# Patient Record
Sex: FEMALE | Race: WHITE | Employment: UNEMPLOYED | URBAN - METROPOLITAN AREA
[De-identification: names, ages, dates, MRNs, and addresses within clinical notes are randomized per-mention and may not be internally consistent; named-entity substitution may affect disease eponyms.]

---

## 2017-05-30 PROBLEM — C50.312 BREAST CANCER OF LOWER-INNER QUADRANT OF LEFT FEMALE BREAST (HCC): Status: ACTIVE | Noted: 2017-05-30

## 2017-06-22 PROBLEM — Z51.0 ENCOUNTER FOR ANTINEOPLASTIC RADIATION THERAPY: Status: ACTIVE | Noted: 2017-06-22

## 2021-05-10 ENCOUNTER — HOSPITAL ENCOUNTER (EMERGENCY)
Age: 76
Discharge: HOME | End: 2021-05-10
Attending: EMERGENCY MEDICINE
Payer: MEDICARE

## 2021-05-10 ENCOUNTER — APPOINTMENT (OUTPATIENT)
Dept: CT IMAGING | Age: 76
End: 2021-05-10
Payer: MEDICARE

## 2021-05-10 VITALS
TEMPERATURE: 97 F | SYSTOLIC BLOOD PRESSURE: 158 MMHG | RESPIRATION RATE: 18 BRPM | HEIGHT: 66 IN | OXYGEN SATURATION: 98 % | HEART RATE: 53 BPM | BODY MASS INDEX: 27.46 KG/M2 | WEIGHT: 170.86 LBS | DIASTOLIC BLOOD PRESSURE: 73 MMHG

## 2021-05-10 DIAGNOSIS — R03.0 ELEVATED BLOOD PRESSURE READING: ICD-10-CM

## 2021-05-10 DIAGNOSIS — R42 DIZZINESS: Primary | ICD-10-CM

## 2021-05-10 LAB
ANION GAP SERPL CALCULATED.3IONS-SCNC: 11 MMOL/L (ref 3–16)
BASOPHILS ABSOLUTE: 0 K/UL (ref 0–0.2)
BASOPHILS RELATIVE PERCENT: 0.5 %
BUN BLDV-MCNC: 36 MG/DL (ref 7–20)
CALCIUM SERPL-MCNC: 9.4 MG/DL (ref 8.3–10.6)
CHLORIDE BLD-SCNC: 105 MMOL/L (ref 99–110)
CO2: 25 MMOL/L (ref 21–32)
CREAT SERPL-MCNC: 1.1 MG/DL (ref 0.6–1.2)
EOSINOPHILS ABSOLUTE: 0.1 K/UL (ref 0–0.6)
EOSINOPHILS RELATIVE PERCENT: 1.4 %
GFR AFRICAN AMERICAN: 59
GFR NON-AFRICAN AMERICAN: 48
GLUCOSE BLD-MCNC: 97 MG/DL (ref 70–99)
GLUCOSE BLD-MCNC: 98 MG/DL
GLUCOSE BLD-MCNC: 98 MG/DL (ref 70–99)
HCT VFR BLD CALC: 32.7 % (ref 36–48)
HEMOGLOBIN: 11.1 G/DL (ref 12–16)
LYMPHOCYTES ABSOLUTE: 1.5 K/UL (ref 1–5.1)
LYMPHOCYTES RELATIVE PERCENT: 22.7 %
MCH RBC QN AUTO: 30.8 PG (ref 26–34)
MCHC RBC AUTO-ENTMCNC: 33.9 G/DL (ref 31–36)
MCV RBC AUTO: 91 FL (ref 80–100)
MONOCYTES ABSOLUTE: 0.8 K/UL (ref 0–1.3)
MONOCYTES RELATIVE PERCENT: 11.7 %
NEUTROPHILS ABSOLUTE: 4.3 K/UL (ref 1.7–7.7)
NEUTROPHILS RELATIVE PERCENT: 63.7 %
PDW BLD-RTO: 14 % (ref 12.4–15.4)
PERFORMED ON: NORMAL
PLATELET # BLD: 252 K/UL (ref 135–450)
PMV BLD AUTO: 8.9 FL (ref 5–10.5)
POTASSIUM REFLEX MAGNESIUM: 4.1 MMOL/L (ref 3.5–5.1)
RBC # BLD: 3.59 M/UL (ref 4–5.2)
SODIUM BLD-SCNC: 141 MMOL/L (ref 136–145)
TROPONIN: <0.01 NG/ML
WBC # BLD: 6.8 K/UL (ref 4–11)

## 2021-05-10 PROCEDURE — 6370000000 HC RX 637 (ALT 250 FOR IP): Performed by: PHYSICIAN ASSISTANT

## 2021-05-10 PROCEDURE — 84484 ASSAY OF TROPONIN QUANT: CPT

## 2021-05-10 PROCEDURE — 6360000002 HC RX W HCPCS: Performed by: PHYSICIAN ASSISTANT

## 2021-05-10 PROCEDURE — 85025 COMPLETE CBC W/AUTO DIFF WBC: CPT

## 2021-05-10 PROCEDURE — 96374 THER/PROPH/DIAG INJ IV PUSH: CPT

## 2021-05-10 PROCEDURE — 80048 BASIC METABOLIC PNL TOTAL CA: CPT

## 2021-05-10 PROCEDURE — 93005 ELECTROCARDIOGRAM TRACING: CPT | Performed by: EMERGENCY MEDICINE

## 2021-05-10 PROCEDURE — 99283 EMERGENCY DEPT VISIT LOW MDM: CPT

## 2021-05-10 PROCEDURE — 70450 CT HEAD/BRAIN W/O DYE: CPT

## 2021-05-10 RX ORDER — MECLIZINE HYDROCHLORIDE 25 MG/1
25 TABLET ORAL 3 TIMES DAILY PRN
Qty: 10 TABLET | Refills: 0 | Status: SHIPPED | OUTPATIENT
Start: 2021-05-10 | End: 2022-03-23

## 2021-05-10 RX ORDER — ONDANSETRON 2 MG/ML
4 INJECTION INTRAMUSCULAR; INTRAVENOUS ONCE
Status: COMPLETED | OUTPATIENT
Start: 2021-05-10 | End: 2021-05-10

## 2021-05-10 RX ORDER — ONDANSETRON 4 MG/1
4 TABLET, ORALLY DISINTEGRATING ORAL EVERY 8 HOURS PRN
Qty: 10 TABLET | Refills: 0 | Status: SHIPPED | OUTPATIENT
Start: 2021-05-10 | End: 2022-03-23

## 2021-05-10 RX ORDER — MECLIZINE HCL 12.5 MG/1
25 TABLET ORAL ONCE
Status: COMPLETED | OUTPATIENT
Start: 2021-05-10 | End: 2021-05-10

## 2021-05-10 RX ADMIN — ONDANSETRON 4 MG: 2 INJECTION INTRAMUSCULAR; INTRAVENOUS at 18:11

## 2021-05-10 RX ADMIN — MECLIZINE 25 MG: 12.5 TABLET ORAL at 18:13

## 2021-05-10 ASSESSMENT — PAIN SCALES - GENERAL: PAINLEVEL_OUTOF10: 7

## 2021-05-10 NOTE — ED PROVIDER NOTES
[address] [facility]      Pt Name: Claudia Sauceda  MRN: [id number]  [place] 1945  Date of evaluation: 5/10/2021  Provider: LADONNA Jarvis    This patient was not seen and evaluated by the attending physician No att. providers found. CHIEF COMPLAINT       Chief Complaint   Patient presents with    Dizziness    Headache         HISTORY OF PRESENT ILLNESS  (Location/Symptom, Timing/Onset, Context/Setting, Quality, Duration, Modifying Factors, Severity.)   Claudia Sauceda is a 77 y.o. female who presents to the emergency department for dizziness and headache. Patient reports that she was outside around 3 PM cleaning the door so that she could paint it. Reports she was bent over with her head looking up when she started with vertigo. She reports she had this a few months ago. She will take Dramamine for typically. She reports nausea but denies vomiting. Denies vision changes, numbness, tingling, difficulty talking, slurred speech, difficulty with word finding. Reports this episode of vertigo is a little different than her prior episodes of vertigo because she also has a headache associated with it. Headache is frontal.  Denies abdominal pain, chest pain, shortness of breath      Nursing Notes were reviewed and I agree. REVIEW OF SYSTEMS    (2-9 systems for level 4, 10 or more for level 5)     Review of Systems   Eyes: Negative for visual disturbance. Respiratory: Negative for shortness of breath. Cardiovascular: Negative for chest pain. Gastrointestinal: Positive for nausea. Negative for abdominal pain and vomiting. Neurological: Positive for dizziness. Negative for speech difficulty and numbness. Except as noted above the remainder of the review of systems was reviewed and negative.        PAST MEDICAL HISTORY         Diagnosis Date    Arthritis     High cholesterol     Hypertension     Sciatica SURGICAL HISTORY           Procedure Laterality Date    BREAST BIOPSY Left 1994 & 2017    BREAST LUMPECTOMY Left 1994 & 2017    BUNIONECTOMY Right 1997    KNEE SURGERY  01/2015       CURRENT MEDICATIONS       Discharge Medication List as of 5/10/2021  9:17 PM      CONTINUE these medications which have NOT CHANGED    Details   lisinopril (PRINIVIL;ZESTRIL) 10 MG tablet Take 10 mg by mouth dailyHistorical Med      simvastatin (ZOCOR) 40 MG tablet Take 40 mg by mouth nightlyHistorical Med      aspirin 81 MG tablet Take 81 mg by mouth dailyHistorical Med      Misc Natural Products (GLUCOSAMINE CHONDROITIN TRIPLE PO) Take by mouthHistorical Med      Omega-3 Fatty Acids (FISH OIL) 1200 MG CAPS Take by mouthHistorical Med      calcium carbonate 600 MG TABS tablet Take 1 tablet by mouth dailyHistorical Med      b complex vitamins capsule Take 1 capsule by mouth dailyHistorical Med      folic acid (FOLVITE) 1 MG tablet Take 1 mg by mouth dailyHistorical Med      FIBER SELECT GUMMIES PO Take by mouthHistorical Med      Naproxen Sodium (ALEVE PO) Take by mouth as neededHistorical Med      acetaminophen (TYLENOL) 325 MG tablet Take 650 mg by mouth every 6 hours as needed for PainHistorical Med             ALLERGIES     Celebrex [celecoxib], Gabapentin, and Valium [diazepam]    FAMILY HISTORY           Problem Relation Age of Onset    Breast Cancer Mother     Cancer Mother         melanoma     Cancer Father         luekemia     Coronary Art Dis Father     Osteoarthritis Father     Parkinsonism Father     Ulcerative Colitis Father      Family Status   Relation Name Status    Mother  (Not Specified)    Father  (Not Specified)        SOCIAL HISTORY      reports that she has never smoked. She does not have any smokeless tobacco history on file. She reports that she does not drink alcohol.     PHYSICAL EXAM    (up to 7 for level 4, 8 or more for level 5)     ED Triage Vitals [05/10/21 1702]   BP Temp Temp Source Pulse Resp SpO2 Height Weight   (!) 178/93 97 °F (36.1 °C) Temporal 64 18 97 % 5' 6\" (1.676 m) 170 lb 13.7 oz (77.5 kg)       Physical Exam  Constitutional:       General: She is not in acute distress. Appearance: Normal appearance. She is well-developed. She is not ill-appearing, toxic-appearing or diaphoretic. HENT:      Head: Normocephalic and atraumatic. Eyes:      Extraocular Movements: Extraocular movements intact. Conjunctiva/sclera: Conjunctivae normal.      Pupils: Pupils are equal, round, and reactive to light. Neck:      Musculoskeletal: Normal range of motion and neck supple. Cardiovascular:      Rate and Rhythm: Normal rate and regular rhythm. Heart sounds: Normal heart sounds. Pulmonary:      Effort: Pulmonary effort is normal. No respiratory distress. Breath sounds: Normal breath sounds. Musculoskeletal: Normal range of motion. Skin:     General: Skin is warm. Neurological:      Mental Status: She is alert. Sensory: No sensory deficit. Motor: No weakness. Coordination: Coordination normal.      Comments: Normal finger to nose, rapid alternating hands, heel to shin bilaterally  No pronator drift  No facial drooping  No drift of the extremities against gravity  5/5 strength of all 4 extremities  Normal and symmetric  strength bilaterlaly  Normal and symmetric sensation throughout  Normal EOM. No nystagmus bilaterally  Negative test of skew  No trunchal instability. Psychiatric:         Mood and Affect: Mood normal.         Behavior: Behavior normal.         Thought Content: Thought content normal.         Judgment: Judgment normal.         DIFFERENTIAL DIAGNOSIS   Vertigo, stroke, other    DIAGNOSTICRESULTS     EKG: All EKG's are interpreted by LADONNA Perez in the absence of a cardiologist.    EKG obtained.  See Dr. Imelda Helms note for interpretation  RADIOLOGY:   Non-plain film images such as CT, Ultrasound and MRI are read by the radiologist. Flora Alvarado radiographic images are visualized and preliminarily interpreted by LADONNA Maki with the below findings:      Interpretation per the Radiologist below, if available at the time of this note:    CT HEAD WO CONTRAST   Final Result   No acute hemorrhage or midline shift. Partial opacification of pneumatized left petrous apex. Other findings as described. LABS:  Results for orders placed or performed during the hospital encounter of 05/10/21   CBC Auto Differential   Result Value Ref Range    WBC 6.8 4.0 - 11.0 K/uL    RBC 3.59 (L) 4.00 - 5.20 M/uL    Hemoglobin 11.1 (L) 12.0 - 16.0 g/dL    Hematocrit 32.7 (L) 36.0 - 48.0 %    MCV 91.0 80.0 - 100.0 fL    MCH 30.8 26.0 - 34.0 pg    MCHC 33.9 31.0 - 36.0 g/dL    RDW 14.0 12.4 - 15.4 %    Platelets 866 015 - 538 K/uL    MPV 8.9 5.0 - 10.5 fL    Neutrophils % 63.7 %    Lymphocytes % 22.7 %    Monocytes % 11.7 %    Eosinophils % 1.4 %    Basophils % 0.5 %    Neutrophils Absolute 4.3 1.7 - 7.7 K/uL    Lymphocytes Absolute 1.5 1.0 - 5.1 K/uL    Monocytes Absolute 0.8 0.0 - 1.3 K/uL    Eosinophils Absolute 0.1 0.0 - 0.6 K/uL    Basophils Absolute 0.0 0.0 - 0.2 K/uL   Basic Metabolic Panel w/ Reflex to MG   Result Value Ref Range    Sodium 141 136 - 145 mmol/L    Potassium reflex Magnesium 4.1 3.5 - 5.1 mmol/L    Chloride 105 99 - 110 mmol/L    CO2 25 21 - 32 mmol/L    Anion Gap 11 3 - 16    Glucose 97 70 - 99 mg/dL    BUN 36 (H) 7 - 20 mg/dL    CREATININE 1.1 0.6 - 1.2 mg/dL    GFR Non- 48 (A) >60    GFR  59 (A) >60    Calcium 9.4 8.3 - 10.6 mg/dL   Troponin   Result Value Ref Range    Troponin <0.01 <0.01 ng/mL   POCT Glucose   Result Value Ref Range    Glucose 98 mg/dL   POCT Glucose   Result Value Ref Range    POC Glucose 98 70 - 99 mg/dl    Performed on ACCU-CHEK        All other labs were withinnormal range or not returned as of this dictation.     EMERGENCY DEPARTMENT COURSE and DIFFERENTIAL DIAGNOSIS/MDM:   Vitals:    Vitals:    05/10/21 1702 05/10/21 2100   BP: (!) 178/93 (!) 158/73   Pulse: 64 53   Resp: 18 18   Temp: 97 °F (36.1 °C)    TempSrc: Temporal    SpO2: 97% 98%   Weight: 170 lb 13.7 oz (77.5 kg)    Height: 5' 6\" (1.676 m)        Patient was nontoxic, well appearing, afebrile with normal vital signs with the exception of hypertension. Saturating well on room air. Has had episodes of vertigo in the past. NIHSS is 0 with negative test of skew and no trunchal instability. Based on this, low suspicion for CVA. CT head obtained due ot her headache and was negative. Labs unremarkable aside form BUN 36. After meclizine and zofran, she reports symptoms are better. She ambulated in department without difficulty. Believe appropriate for outpatient management. Will discharge with Zofran and meclizine. Instructed to follow-up with PCP next few days for reevaluation return for worsening. Agreed understood. I completed a structured, evidence-based clinical evaluation to screen for acute stroke and neurologic deficits in this patient. The patient has a normal detailed neurologic exam, which is highly sensitive for dangerous causes of dizziness, vertigo, or loss of balance. The evidence indicates that the patient is very low risk for an acute neurologic emergency, and this is consistent with my clinical intuition. The risk of further workup or hospitalization is likely higher than the risk of the patient having a stroke or other dangerous neurologic condition. It is, therefore, in the patients best interest not to do additional emergent testing or to be hospitalized at this time. PROCEDURES:  None    FINAL IMPRESSION      1. Dizziness    2.  Elevated blood pressure reading          DISPOSITION/PLAN   DISPOSITION Decision To Discharge 05/10/2021 09:41:52 PM      PATIENT REFERRED TO:  David White MD  [address]  [address] [address]  [phone]    Schedule an appointment as soon as possible for a visit in 2 days  for reevaluation    [facility] Emergency Department  [address] [facility]  [phone]    As needed, If symptoms worsen      DISCHARGE MEDICATIONS:  Discharge Medication List as of 5/10/2021  9:17 PM      START taking these medications    Details   meclizine (ANTIVERT) 25 MG tablet Take 1 tablet by mouth 3 times daily as needed for Dizziness, Disp-10 tablet, R-0Print      ondansetron (ZOFRAN ODT) 4 MG disintegrating tablet Take 1 tablet by mouth every 8 hours as needed for Nausea or Vomiting Let dissolve in mouth., Disp-10 tablet, R-0Print             (Please note that portions of this note werecompleted with a voice recognition program.  Efforts were made to edit the dictations but occasionally words are mis-transcribed.)    Stewart Coates, [address] [place] [address]  05/11/21 2971

## 2021-05-10 NOTE — ED NOTES
Patient voices her headache is now a 2/10, dizziness has resolved, nausea has resolved.       Gume Osuna RN  05/10/21 1929

## 2021-05-11 LAB
EKG ATRIAL RATE: 59 BPM
EKG DIAGNOSIS: NORMAL
EKG P AXIS: 63 DEGREES
EKG P-R INTERVAL: 144 MS
EKG Q-T INTERVAL: 426 MS
EKG QRS DURATION: 80 MS
EKG QTC CALCULATION (BAZETT): 421 MS
EKG R AXIS: 24 DEGREES
EKG T AXIS: 41 DEGREES
EKG VENTRICULAR RATE: 59 BPM

## 2021-05-11 PROCEDURE — 93010 ELECTROCARDIOGRAM REPORT: CPT | Performed by: INTERNAL MEDICINE

## 2021-05-11 ASSESSMENT — ENCOUNTER SYMPTOMS
ABDOMINAL PAIN: 0
NAUSEA: 1
SHORTNESS OF BREATH: 0
VOMITING: 0

## 2021-05-11 NOTE — ED NOTES
Patient discharged with discharge instructions and medications reviewed. Patient verbalized understanding of instructions and follow up.        Karina Rodney, JOSE JUAN  05/10/21 9209

## 2021-05-11 NOTE — ED PROVIDER NOTES
[address] [facility]      Pt Name: Elvia Botello  MRN: [id number]  [place] 1945  Date of evaluation: 5/10/2021  Provider: Nora Richmond, [address] [place] [address]  Chief Complaint   Patient presents with    Dizziness    Headache       I have fully participated in the care of Elvia Botello and have had a face-to-face evaluation. I have reviewed and agree with all pertinent clinical information, and midlevel provider's history, and physical exam. I have also reviewed the labs, EKG, and imaging studies and treatment plan. I have also reviewed and agree with the medications, allergies and past medical history section for this Elvia Botello. I agree with the diagnosis, and I concur. I wore personal protective equipment when I was in the room the entire time. This includes gloves, N95 mask, face shield, and a glove over my stethoscope for protection. Past Medical History:   Diagnosis Date    Arthritis     High cholesterol     Hypertension     Sciatica        MDM:  Elvia Botello is a 77 y.o. female who presents with dizziness that she describes as vertigo. And this is her typical vertigo. However, she had a headache with this episode. She has had headaches in the past but never with her vertigo. Symptoms were different this time and she just came in for an evaluation. Physical exam was normal.  However, she did have slow closure of her right eye initially but that resolved without any treatment. Physical exam she had an NIH stroke scale of 0. Neuro exam was negative. Remainder of her work-up was normal.  There is no nystagmus. CT scan of her head was negative. The remainder of her work-up was normal.  She improved with Antivert and Zofran. Patient preferred to be discharged. She was discharged to follow with her doctor in 3 to 5 days and return if any problems.   Her NIH stroke scale was 0 on arrival and 0 at discharge. Vitals:    05/10/21 2100   BP: (!) 158/73   Pulse: 53   Resp: 18   Temp:    SpO2: 98%       Lab results  Labs Reviewed   CBC WITH AUTO DIFFERENTIAL - Abnormal; Notable for the following components:       Result Value    RBC 3.59 (*)     Hemoglobin 11.1 (*)     Hematocrit 32.7 (*)     All other components within normal limits    Narrative:     Performed at:  [facility]  [address] [facility] [address]   Phone [phone]   BASIC METABOLIC PANEL W/ REFLEX TO MG FOR LOW K - Abnormal; Notable for the following components:    BUN 36 (*)     GFR Non- 48 (*)     GFR  59 (*)     All other components within normal limits    Narrative:     Performed at:  [facility]  [address] [facility] [address]   Phone [phone]   POCT GLUCOSE - Normal   TROPONIN    Narrative:     Performed at:  [facility]  [address] [facility] [address]   Phone [phone]   POCT GLUCOSE    Narrative:     Performed at:  [address] [place] [address]  [address] [facility]   Phone [phone]       EKG Results  EKG Interpretation    Interpreted by emergency department physician  Time performed: 7770  Time read: 1713    Rhythm: Sinus bradycardia  Ventricular Rate: 59  QRS Axis: 24  Ectopy: None  Conduction: Sinus bradycardia with biatrial abnormality  ST Segments: normal  T Waves: normal  Q Waves: None noted    Other findings: Motion artifact but EKG is readable    Compared to EKG on: None to compare    Clinical Impression: Sinus bradycardia with biatrial abnormality but otherwise normal EKG. There is motion artifact but EKG is readable. There is no old EKG to compare. [address]      Radiology results  CT HEAD WO CONTRAST   Final Result   No acute hemorrhage or midline shift.       Partial opacification of pneumatized left petrous apex. Other findings as described. See discharge instructions for specific medications, discharge information, and treatments. They were verbally instructed to return to emergency if any problems. Medications   ondansetron (ZOFRAN) injection 4 mg (4 mg Intravenous Given 5/10/21 1811)   meclizine (ANTIVERT) tablet 25 mg (25 mg Oral Given 5/10/21 1813)       Discharge Medication List as of 5/10/2021  9:17 PM      START taking these medications    Details   meclizine (ANTIVERT) 25 MG tablet Take 1 tablet by mouth 3 times daily as needed for Dizziness, Disp-10 tablet, R-0Print      ondansetron (ZOFRAN ODT) 4 MG disintegrating tablet Take 1 tablet by mouth every 8 hours as needed for Nausea or Vomiting Let dissolve in mouth., Disp-10 tablet, R-0Print             The patient's blood pressure was found to be elevated according to CMS/Medicare and the Affordable Care Act/ObamaCare criteria. Elevated blood pressure could occur because of pain or anxiety or other reasons and does not mean that they need to have their blood pressure treated or medications otherwise adjusted. However, this could also be a sign that they will need to have their blood pressure treated or medications changed. The patient was instructed to follow up closely with their personal physician to have their blood pressure rechecked. The patient was instructed to take a list of recent blood pressure readings to their next visit with their personal physician. IMPRESSIONS:  1. Dizziness    2.  Elevated blood pressure reading               Lisha Hicks DO  05/10/21 4914

## 2021-05-11 NOTE — ED NOTES
Patient ambulated to bathroom and back, no c/o dizziness, weakness, or nausea.       Maikel Matthews RN  05/10/21 1259

## 2022-01-20 ENCOUNTER — OFFICE VISIT (OUTPATIENT)
Dept: UROGYNECOLOGY | Age: 77
End: 2022-01-20
Payer: MEDICARE

## 2022-01-20 VITALS
HEART RATE: 78 BPM | OXYGEN SATURATION: 96 % | DIASTOLIC BLOOD PRESSURE: 93 MMHG | RESPIRATION RATE: 14 BRPM | SYSTOLIC BLOOD PRESSURE: 175 MMHG | TEMPERATURE: 98 F

## 2022-01-20 DIAGNOSIS — N32.81 OAB (OVERACTIVE BLADDER): ICD-10-CM

## 2022-01-20 DIAGNOSIS — R39.15 URINARY URGENCY: ICD-10-CM

## 2022-01-20 DIAGNOSIS — N39.41 URGE INCONTINENCE: ICD-10-CM

## 2022-01-20 DIAGNOSIS — R35.0 URINARY FREQUENCY: ICD-10-CM

## 2022-01-20 DIAGNOSIS — M51.26 DISPLACEMENT OF LUMBAR INTERVERTEBRAL DISC WITHOUT MYELOPATHY: Primary | ICD-10-CM

## 2022-01-20 PROBLEM — D05.12 INTRADUCTAL CARCINOMA IN SITU OF LEFT BREAST: Status: ACTIVE | Noted: 2017-05-01

## 2022-01-20 PROBLEM — C50.319 MALIGNANT NEOPLASM OF LOWER-INNER QUADRANT OF FEMALE BREAST (HCC): Status: ACTIVE | Noted: 2022-01-20

## 2022-01-20 PROBLEM — R49.0 DYSPHONIA: Status: ACTIVE | Noted: 2018-02-15

## 2022-01-20 PROBLEM — R49.0 DYSPHONIA OF ESSENTIAL TREMOR: Status: ACTIVE | Noted: 2018-02-15

## 2022-01-20 PROBLEM — R25.1 DYSPHONIA OF ESSENTIAL TREMOR: Status: ACTIVE | Noted: 2018-02-15

## 2022-01-20 PROBLEM — I10 HYPERTENSION: Status: ACTIVE | Noted: 2022-01-20

## 2022-01-20 PROBLEM — M43.10 SPONDYLOLISTHESIS: Status: ACTIVE | Noted: 2017-02-08

## 2022-01-20 PROBLEM — M47.816 SPONDYLOSIS OF LUMBAR REGION WITHOUT MYELOPATHY OR RADICULOPATHY: Status: ACTIVE | Noted: 2022-01-20

## 2022-01-20 PROBLEM — E78.2 ELEVATED TRIGLYCERIDES WITH HIGH CHOLESTEROL: Status: ACTIVE | Noted: 2019-11-06

## 2022-01-20 PROBLEM — M47.817 LUMBOSACRAL SPONDYLOSIS WITHOUT MYELOPATHY: Status: ACTIVE | Noted: 2022-01-20

## 2022-01-20 PROBLEM — M53.3 SACROILIAC JOINT DYSFUNCTION OF RIGHT SIDE: Status: ACTIVE | Noted: 2017-09-26

## 2022-01-20 PROCEDURE — G8427 DOCREV CUR MEDS BY ELIG CLIN: HCPCS | Performed by: OBSTETRICS & GYNECOLOGY

## 2022-01-20 PROCEDURE — 1090F PRES/ABSN URINE INCON ASSESS: CPT | Performed by: OBSTETRICS & GYNECOLOGY

## 2022-01-20 PROCEDURE — G8417 CALC BMI ABV UP PARAM F/U: HCPCS | Performed by: OBSTETRICS & GYNECOLOGY

## 2022-01-20 PROCEDURE — G8484 FLU IMMUNIZE NO ADMIN: HCPCS | Performed by: OBSTETRICS & GYNECOLOGY

## 2022-01-20 PROCEDURE — 0509F URINE INCON PLAN DOCD: CPT | Performed by: OBSTETRICS & GYNECOLOGY

## 2022-01-20 PROCEDURE — 99203 OFFICE O/P NEW LOW 30 MIN: CPT | Performed by: OBSTETRICS & GYNECOLOGY

## 2022-01-20 RX ORDER — SOLIFENACIN SUCCINATE 10 MG/1
10 TABLET, FILM COATED ORAL DAILY
Qty: 30 TABLET | Refills: 1 | Status: SHIPPED | OUTPATIENT
Start: 2022-01-20 | End: 2022-01-20

## 2022-01-20 RX ORDER — SOLIFENACIN SUCCINATE 10 MG/1
10 TABLET, FILM COATED ORAL DAILY
Qty: 30 TABLET | Refills: 1 | Status: SHIPPED | OUTPATIENT
Start: 2022-01-20 | End: 2022-03-23

## 2022-01-20 ASSESSMENT — ENCOUNTER SYMPTOMS
BACK PAIN: 1
CONSTIPATION: 1

## 2022-01-20 NOTE — PROGRESS NOTES
1/20/2022      HPI:     Name: Joshua Kamara  YOB: 1945    CC: Patient is a 77 y.o. female who is seen in consultation from No ref. provider found   for evaluation of urge incontinence  and UTI.     HPI: Was seeing Dr. Alejandro Ramachandran in 9/2020 had urodynamics, and cystoscopy done approx 9/25/2020  [facility]   presents for Urodynamic studies. Alternative treatments, risks and benefits were discussed with the patient and they chose to proceed giving informed consent. Procedure: Urodynamic studies were performed in the standard fashion as demonstrated by the following procedure codes: [address], [address], [address], [id number]. Chinese Or, RN, RN    Uroflow:  Voided volume 77.8 ml   Residual volume 340 ml   Maximum flow (QMax) 3.4 ml/sec   Average flow rate (QAvg) 4 ml/sec   [] Patient was unable to void for uroflow. Catheterized for 0 ml.  [] Voided amount <30 ml and no data recorded. Catheterized for 0 ml. Comments:     Cystometrogram:  First Sensation 105 ml   First Desire 198 ml   Maximum Cystometric Capacity 312 ml   Total Infused Volume 321 ml   Uninhibited Detrusor Contraction [] no  [x] yes  at 312 ml,   without incontinence       Comments:     Leak Point Pressures:  1st supine 121 ml at 108 cm/H2O negative leak with no amount of leakage with cough/valsalva   2nd supine 200 ml at 81 cm/H2O positive leak with small amount of leakage with cough/valsalva         Micturition:  Max detrusor pressure at peak flow (Pdet at Qmax) 10.1 cm/H2O       Maximum/Peak flow rate (QMax) 5.3 ml/sec   Flow time 19.2 sec   Voided volume 67.5 ml   Residual 228 ml     EMG: [x] Coordinated [] non-coordinated [] appropriate [] artifactual   [] Patient was unable to void for micturition. Catheterized for 0 ml. Comments:     TEST SUMMARY:  Free flow uroflowmetry shows a normal flow pattern with inadequate emptying. 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  Sensation is normal. Pressure flow studies demonstrate a normal flow pattern, normal voiding pressures, appropriate electromyogram, and inadequate bladder emptying. Provocative studies show evidence of no and stress incontinence. An after-contraction is noted on the voiding study, which may be suggestive of detrusor instability        Bladder control problem: Yes   How many months have you had a bladder problem? 2 years  Do you use pads to absorb lost urine? Yes. If yes how many pads do you wear a day? 1   How many trips do you make to the bathroom during the day? 9-10  How many times do you wake at night to go to the bathroom? 2-3  Do you ever wet the bed while asleep? No  Are there times when you cannot make it to the bathroom on time? No  Does sound, sight, or feel of running water cause you to lose urine? No  How many glasses of liquid do you consume daily? 55-65  How many drinks containing caffeine do you consume daily? 1  Which best describes urine loss: (Check all that apply)   [x] I lose urine during coughing, sneezing, running, lifting   [] I lose urine with changes in posture, standing, walking   [] I lose urine continuously such that I am constantly wet   [] I have sudden, urgent needs without the ability to make it to the bathroom  Have you seen a physician for complaints of urine loss? Yes If yes, who? Ana  Have you taken medication to prevent urine loss? Yes If yes, what meds? Myrbetriq 50mg. Which seems to help a little, but cost is a major concern. >$100per month. Has not tried any other bladder medications. Bladder emptying problems:  Yes, reports occasional difficulty starting her urine stream. However she does feel like bladder is empty after voiding. Prolapse/Vaginal Support problems: No   Bowel problem(s): Yes just occasional constipation, which she uses miralax for. Sexual History:  has no history on file for sexual activity.   Pelvic Pain:  No  Ob/Gyn History:    OB History    Para Term  AB Living   2 2 2     2   SAB IAB Ectopic Molar Multiple Live Births             2      # Outcome Date GA Lbr Chase/2nd Weight Sex Delivery Anes PTL Lv   2 Term      Vag-Spont   CLEOPATRA   1 Term      Vag-Spont   CLEOPATRA     Past Medical History:   Past Medical History:   Diagnosis Date    Arthritis     Breast cancer ([facility] [address]     High cholesterol     Hypertension     Sciatica      Past Surgical History:   Past Surgical History:   Procedure Laterality Date    BREAST BIOPSY Left 1994 & 2017    BREAST LUMPECTOMY Left 1994 & 2017    BUNIONECTOMY Right 1997    KNEE SURGERY  01/2015     Allergies: Allergies   Allergen Reactions    Celebrex [Celecoxib]     Gabapentin      dizzy    Valium [Diazepam]      Current Medications:  Current Outpatient Medications   Medication Sig Dispense Refill    lisinopril (PRINIVIL;ZESTRIL) 10 MG tablet Take 10 mg by mouth daily      simvastatin (ZOCOR) 40 MG tablet Take 40 mg by mouth nightly      aspirin 81 MG tablet Take 81 mg by mouth daily      Misc Natural Products (GLUCOSAMINE CHONDROITIN TRIPLE PO) Take by mouth      Omega-3 Fatty Acids (FISH OIL) 1200 MG CAPS Take by mouth      calcium carbonate 600 MG TABS tablet Take 1 tablet by mouth daily      b complex vitamins capsule Take 1 capsule by mouth daily      FIBER SELECT GUMMIES PO Take by mouth      solifenacin (VESICARE) 10 MG tablet Take 1 tablet by mouth daily 30 tablet 1    meclizine (ANTIVERT) 25 MG tablet Take 1 tablet by mouth 3 times daily as needed for Dizziness (Patient not taking: Reported on 1/20/2022) 10 tablet 0    ondansetron (ZOFRAN ODT) 4 MG disintegrating tablet Take 1 tablet by mouth every 8 hours as needed for Nausea or Vomiting Let dissolve in mouth.  (Patient not taking: Reported on 1/20/2022) 10 tablet 0    folic acid (FOLVITE) 1 MG tablet Take 1 mg by mouth daily (Patient not taking: Reported on 1/20/2022)      Naproxen Sodium (ALEVE PO) Take by mouth as needed (Patient not taking: Reported on 1/20/2022)      acetaminophen (TYLENOL) 325 MG tablet Take 650 mg by mouth every 6 hours as needed for Pain (Patient not taking: Reported on 1/20/2022)       No current facility-administered medications for this visit. Social History:   Social History     Socioeconomic History    Marital status:      Spouse name: Not on file    Number of children: Not on file    Years of education: Not on file    Highest education level: Not on file   Occupational History    Not on file   Tobacco Use    Smoking status: Never Smoker    Smokeless tobacco: Never Used   Substance and Sexual Activity    Alcohol use: No    Drug use: Never    Sexual activity: Not on file   Other Topics Concern    Not on file   Social History Narrative    Not on file     Social Determinants of Health     Financial Resource Strain:     Difficulty of Paying Living Expenses: Not on file   Food Insecurity:     Worried About Running Out of Food in the Last Year: Not on file    Brittni of Food in the Last Year: Not on file   Transportation Needs:     Lack of Transportation (Medical): Not on file    Lack of Transportation (Non-Medical):  Not on file   Physical Activity:     Days of Exercise per Week: Not on file    Minutes of Exercise per Session: Not on file   Stress:     Feeling of Stress : Not on file   Social Connections:     Frequency of Communication with Friends and Family: Not on file    Frequency of Social Gatherings with Friends and Family: Not on file    Attends Faith Services: Not on file    Active Member of Clubs or Organizations: Not on file    Attends Club or Organization Meetings: Not on file    Marital Status: Not on file   Intimate Partner Violence:     Fear of Current or Ex-Partner: Not on file    Emotionally Abused: Not on file    Physically Abused: Not on file    Sexually Abused: Not on file   Housing Stability:     Unable to Pay for Housing in the Last Year: Not on file    Number of [facility] in the Last Year: Not on file    Unstable Housing in the Last Year: Not on file     Family History:   Family History   Problem Relation Age of Onset    Breast Cancer Mother     Cancer Mother         melanoma     Cancer Father         luekemia     Coronary Art Dis Father     Osteoarthritis Father     Parkinsonism Father     Ulcerative Colitis Father      Review of System  Review of Systems   Gastrointestinal: Positive for constipation. Musculoskeletal: Positive for arthralgias, back pain and neck pain. All other systems reviewed and are negative. A review of systems was done by the patient and reviewed by the provider. Objective:     Vital Signs  Vitals:    01/20/22 1254   BP: (!) 175/93   Pulse: 78   Resp: 14   Temp: 98 °F (36.7 °C)   SpO2: 96%     Physical Exam  Physical Exam  HENT:      Head: Normocephalic and atraumatic. Eyes:      Conjunctiva/sclera: Conjunctivae normal.   Pulmonary:      Effort: Pulmonary effort is normal.   Abdominal:      Palpations: Abdomen is soft. Musculoskeletal:      Cervical back: Normal range of motion and neck supple. Skin:     General: Skin is warm and dry. Neurological:      Mental Status: She is alert and oriented to person, place, and time. Office Fill Study/Urine Dip:     Using sterile technique a manometry catheter was placed. Patient's bladder was filled with sterile water by gravity. Capacity and storage volumes were measured. Spasms assessed. Catheter was removed. Stress urinary incontinence was assessed. No results found for this visit on 01/20/22. Assessment/Plan:     Kalyani Okeefe is a 77 y.o. female with   1. Displacement of lumbar intervertebral disc without myelopathy    2. OAB (overactive bladder)    3. Urinary urgency    4. Urinary frequency    5.  Urge incontinence    Old records reviewed, outside records were reviewed, outside urodynamics were reviewed    She presents today as a follow-up for her overactive

## 2022-02-02 RX ORDER — TOLTERODINE 4 MG/1
4 CAPSULE, EXTENDED RELEASE ORAL DAILY
Qty: 30 CAPSULE | Refills: 2 | Status: SHIPPED | OUTPATIENT
Start: 2022-02-02 | End: 2022-03-23

## 2022-02-17 NOTE — PROGRESS NOTES
[facility] Outpatient Physical Therapy  [facility] [address]  Phone: [phone]  Fax: [phone]                                                      Physical Therapy Certification    Dear Referring Practitioner: Terrance Kinsey MD,    We had the pleasure of evaluating the following patient for physical therapy services at [place] [address]. A summary of our findings can be found in the initial assessment below. This includes our plan of care. If you have any questions or concerns regarding these findings, please do not hesitate to contact me at the office phone number checked above. Thank you for the referral.       Physician Signature:_______________________________Date:__________________  By signing above (or electronic signature), therapist's plan is approved by physician      Patient: Kiran Pillai   : 1945   MRN: [id number]  Referring Physician: Referring Practitioner: Terrance Kinsey MD      Evaluation Date: 2022      Medical Diagnosis Information:  Diagnosis: N32.81 - OAB (overactive bladder)                                             Insurance information: PT Insurance Information: Medicare    Second person requested for examination:  [x] No    [] Yes   If yes, who was present:    Precautions/ Contra-indications: NA    Latex Allergy:  [x]NO      []YES    Preferred Language for Healthcare:   [x]English       []Other:    C-SSRS Triggered by Intake questionnaire (Past 2 wk assessment ):   [x] No, Questionnaire did not trigger screening.   [] Yes, Patient intake triggered C-SSRS Screening     [] Completed, no further action required.    [] Completed, PCP notified via Epic    Functional Outcome:   PFDI-20: 71.88  Sub-sections: POPDI-6 Score 20.83; CRADI-8 Score: 9.38; MAGDI-6 Score: 41.67    Female Sexual Function Index (FSFI) NA/36 - not currently involved with penetrative intercourse due to spouse's health problems    SUBJECTIVE: Patient stated complaint:\"overactive bladder\"  - complains of urinary frequency and constant urge - small amount of stress urinary incontinence with coughing or sneezing, but certainly not her main concern. Patient has stopped taking her bladder control medication on 2/17 due to side effects - worst symptom was constipation. Patient reports symptoms began about 2 years ago - thought she was having recurrent UTIs, but did not have positive cultures. Started on and took bladder control medication for 90 days, then stopped due to cost.  Urgency worsened again after having COVID in 9/2021. Reports symptoms increase with change of positions, cough/sneeze, cold weather, and anxiety. Reports increased stress in life due to daughter who recently went through a divorce due to abuse with 3 children who are young adults and need psychological counseling. Also, spouse is not in good health and expecting need for another surgery soon. Rates severity of problem 8/10. Goal: \"less trips to the bathroom especially during the night (3-4 time)\" - gets up early every day and needs to take a nap in the afternoon since she is not well rested    Pregnancies: [] No    [x] Yes, if yes # 2  Deliveries: # and type: 2, vaginal   Menopause - 2000     4 week or greater of failed trial of PFPT program?   [x] No    [] Yes    PFPT program as defined by \"Completing 4 weeks of an ordered plan of pelvic muscle exercises designed to increase periurethral muscle strength\". Relevant Medical History:  Per Dr. Hellen Covington on 1/20/2022  CC: Patient is a 77 y.o. female who is seen in consultation from No ref. provider found   for evaluation of urge incontinence  and UTI.      HPI: Was seeing Dr. Laurent Going in 9/2020 had urodynamics, and cystoscopy done approx 9/25/2020  UDE   presents for Urodynamic studies.    Alternative treatments, risks and benefits were discussed with the patient and they chose to proceed giving informed consent. TEST SUMMARY:  Free flow uroflowmetry shows a normal flow pattern with inadequate emptying. 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 Sensation is normal. Pressure flow studies demonstrate a normal flow pattern, normal voiding pressures, appropriate electromyogram, and inadequate bladder emptying. Provocative studies show evidence of no and stress incontinence. An after-contraction is noted on the voiding study, which may be suggestive of detrusor instability  Bladder control problem: Yes   How many months have you had a bladder problem? 2 years  Do you use pads to absorb lost urine? Yes. If yes how many pads do you wear a day? 1   How many trips do you make to the bathroom during the day? 9-10  How many times do you wake at night to go to the bathroom? 2-3  Do you ever wet the bed while asleep? No  Are there times when you cannot make it to the bathroom on time? No  Does sound, sight, or feel of running water cause you to lose urine? No  How many glasses of liquid do you consume daily? 55-65  How many drinks containing caffeine do you consume daily? 1  Which best describes urine loss: (Check all that apply)  · [x]? I lose urine during coughing, sneezing, running, lifting  · []? I lose urine with changes in posture, standing, walking  · []? I lose urine continuously such that I am constantly wet  · []? I have sudden, urgent needs without the ability to make it to the bathroom  Have you seen a physician for complaints of urine loss? Yes If yes, who? [place]  Have you taken medication to prevent urine loss? Yes If yes, what meds? Myrbetriq 50mg. Which seems to help a little, but cost is a major concern. >$100per month. Has not tried any other bladder medications. Bladder emptying problems:  Yes, reports occasional difficulty starting her urine stream. However she does feel like bladder is empty after voiding.    Prolapse/Vaginal Support problems: No Bowel problem(s): Yes just occasional constipation, which she uses miralax for. Sexual History:  has no history on file for sexual activity. Pelvic Pain:  No   Past Medical History:   Past Medical History        Past Medical History:   Diagnosis Date    Arthritis      Breast cancer ([address]      High cholesterol      Hypertension      Sciatica           Past Surgical History:   Past Surgical History         Past Surgical History:   Procedure Laterality Date    BREAST BIOPSY Left 1994 & 2017    BREAST LUMPECTOMY Left 1994 & 2017    BUNIONECTOMY Right 1997    KNEE SURGERY   01/2015         Review of System  Review of Systems   Gastrointestinal: Positive for constipation. Musculoskeletal: Positive for arthralgias, back pain and neck pain. All other systems reviewed and are negative. Assessment/Plan:      Roverto Navarrete is a 77 y.o. female with   1. Displacement of lumbar intervertebral disc without myelopathy    2. OAB (overactive bladder)    3. Urinary urgency    4. Urinary frequency    5. Urge incontinence    Old records reviewed, outside records were reviewed, outside urodynamics were reviewed     She presents today as a follow-up for her overactive bladder. She was seen and evaluated at the [address] [place] [address] and had urodynamics done which showed a bladder spasm. She was placed on Myrbetriq however it was very expensive and did not help all of the way. She was not given a prescription for pelvic floor physical therapy at that time. After long discussion today and a review of all of her medications she has agreed to try Vesicare and do pelvic floor physical therapy.   She will follow-up with me for a cystourethroscopy in approximately 8 weeks.         Pain Scale: 0/10 - reports weakness and pain in shoulders due to torn rotator cuffs and neck issues  Easing factors: NA  Provocative factors: NA  Type: []Constant   []Intermittent  []Radiating []Localized []other:    Numbness/Tingling: NA    Severity of problem: 8/10     Occupation/School: homemaker     Living Status/Prior Level of Function: Prior to this injury / incident, pt was independent with ADLs and IADLs, exercises daily - 30 minutes on treadmill, sitting elliptical 15-20 minutes, weight machines for LEs     OBJECTIVE:  Ortho Screen:  Posture - moderately rounded shoulders  Other Observation  Palpation - moderate tightness in bilateral upper traps  Alignment    ROM LEFT RIGHT Comments   Cervical Side Bend   Mod to severe limitations   Cervical Rotation   Mod to severe limitations   Shoulder Flex   Limited to ~ 110*   Shoulder Abd      Shoulder ER      Shoulder IR            Lumbar Side Bend   Moderate limitations   Lumbar Rotation   Moderate limitations   Hip Flexion      Hip Abd      Hip ER   Mild to moderate limitations   Hip IR   Mild to moderate limitations   Hip Extension      Knee Ext      Knee Flex            Hamstring Flex   Moderate limitations - R>L   Piriformis   Mild to moderate limitations                   Strength LEFT RIGHT Comments   Shoulder flexion   3+-4-/5   Shoulder scaption      Shoulder ER      Shoulder IR      Biceps   4/5   Triceps   4/5               Multifidus      Transverse Ab   See below   Hip Flexors   4/5   Hip Abductors   4-/5   Hip Extensors      Hip Internal Rotators   4/5   Hip External Rotators   4/5     TA Muscle Contraction Scale    Criteria                                               Score  Quality of Contraction   Not Present      [] 0   Rapid, Superificial     [x] 1   Just Perceptible     [] 2     Gentle, Slow                [] 3    Substitution   Resting       [] 0   Moderate to Strong                           [x] 1    Subtle Perceptible     [] 2   None                  [] 3    Symmetry of Contraction   Unilateral                  [] 0   Bilateral/Asymmetrical                [] 1   Symmetrical                  [x] 2    Breathing     Inability/Difficulty Breathing during contraction                [x] 0   Able to hold contraction while Breathing             [] 1    Holding   Able to Hold Contraction <10 s                         [x] 0   Able to Hold Contraction >10 s               [] 1      4/10  Adapted from [id number], Copyright 2009      Abdominals:  Scarring:   [x] No    [] Yes  Diastasis:   [x] No    [] Yes  Functional stability:   [x] No    [] Yes - moderate coordination deficits with initial activation of TA/core stabilization    Pelvic Floor:  Observation  Skin condition - intact, slightly reddened  Scarring - none noted  Perineal descent - moderately limited excursion noted  External clock - moderate tightness and mild tenderness noted in transverse perineal and perineal body  Contraction voluntary/reflexive - mostly accessory muscles - noted contraction in buttocks and hip add mostly  Relaxation voluntary/bear down -  mostly accessory muscles - noted in buttocks and hip add mostly    Internal Assessment  Introitus - moderate atrophy  Vaginal vault - moderate atrophy  Internal clock   Superficial - moderate to severe tightness and tenderness with burning sensation with firm palpation at 5-6 o'clock position, burning subsided with release of palpation pressure   Middle - mild tightness and tenderness noted   Deep - mild to moderated tightness and tenderness noted  Prolapse Test - increased pelvic organ descent with coughing noted, improve with cues for bracing with quick flick just prior  Quality of contraction - initially poor to no contraction with significant use of accessory muscles, with coaching and coordination with diaphragmatic breathing and cues on abdomen as well as transvaginally able to produce, minimal contraction  Coordination- significantly limited, improved slight with quick flicks when shifted focus to use of anal sphincter    PERF score  Power - 0-1/5 -  initially poor to no contraction with significant use of accessory muscles, with coaching and coordination with diaphragmatic (G93.40)  []MS (G35)  []Post-polio (G14)  []SCI  []TBI  []ALS Other conditions  []Fibromyalgia (M79.7)  []Vertigo  []Syncope  []Kidney Failure  []Cancer      []currently undergoing                treatment  []Pregnancy  []Incontinence   Prior surgeries  []involved limb  []previous spinal surgery  [] section birth  []hysterectomy  []bowel / bladder surgery  []other relevant surgeries   []Other:   Breast cancer - S/P breast biopsy and lumpectomy  & , bunionectomy , knee surgery 2015           Barriers to/and or personal factors that will affect rehab potential:              [x]Age  [x]Sex   []Smoker              []Motivation/Lack of Motivation                        [x]Co-Morbidities              []Cognitive Function, education/learning barriers              []Environmental, home barriers              []profession/work barriers  []past PT/medical experience  []other:  Justification: Patient demonstrates interest in and motivation for maximizing potential for improved PF muscle and bladder control. Falls Risk Assessment (30 days):   [x] Falls Risk assessed and no intervention required. Reports h/o vertigo, impaired depth perception after recent cataract surgery  [] Falls Risk assessed and Patient requires intervention due to being higher risk   TUG score (>12s at risk):     [] Falls education provided, including         ASSESSMENT:     Patient presents to PF PT with complaints of \"overactive bladder\"  - complains of urinary frequency and constant urge - small amount of stress urinary incontinence with coughing or sneezing, but certainly not her main concern. Patient has stopped taking her bladder control medication on  due to side effects - worst symptom was constipation. Patient reports symptoms began about 2 years ago - thought she was having recurrent UTIs, but did not have positive cultures.   Started on and took bladder control medication for 90 days, then stopped due to cost. Urgency worsened again after having COVID in 9/2021. Reports symptoms increase with change of positions, cough/sneeze, cold weather, and anxiety. Rates severity of problem 8/10. Patient's goals:  \"less trips to the bathroom especially during the night (3-4 time)\" - gets up early every day and needs to take a nap in the afternoon since she is not well rested      After reviewing bowel/bladder behavioral modification information, PT used a pelvic floor model to orient the patient with her pelvic organ and pelvic floor muscles anatomy. Patient verbally consented to transvaginal pelvic muscle floor muscle assessment which revealed poor control/power of her pelvic floor muscles with inconsistent mind-body connection and limited coordination. Despite coaching and attempts to coordinate pelvic floor contractions with exhalation during diaphragmatic breathing, patient was able to demonstrate up to 1/5 muscle contraction for <1 second (10 seconds is normal). Noted moderate core instability and incoordination with initial attempts of contraction of transverse abdominus. With transvaginal palpation to PF muscles, noted moderate to severe tightness and tenderness especially in most superficial layer of in PF muscles, with mild to moderate tightness and tenderness in the middle to deeper layers. Instructed patient in diaphragmatic breathing for PF relaxation and overall down training and quick flicks for urinary urge suppression. Patient would definitely benefit from pelvic floor physical therapy for manual interventions, continued education on healthy bladder/bowel habits and adjustment of behavioral modifications as well as advanced activities to improve muscle control/ coordination and endurance of pelvic floor, core, and hip muscles to decrease urinary frequency, urgency and occasional stress incontinence, as well as constipation which may be contributing to her bladder symptoms.     Functional Impairments: [x]Noted lumbar/proximal hip hypomobility  []Noted lumbosacral and/or generalized hypermobility  [x]Decreased core/proximal hip strength and neuromuscular control   []Decreased LE functional strength  []pelvic/sacral/spinal malalignment   [x]Increased pain with penetration  [x]Absent/poor control of PF contraction   [x]Absent/poor control of PF relaxation  [x]Decreased control of bladder  []Decreased control of bowel    Functional Activity Limitations (from functional questionnaire and intake)  [x]Reduced ability to maintain good posture and demonstrate good body mechanics with sitting, bending, and lifting  [x]Reduced ability to perform lifting, reaching, carrying tasks  [x]Reduced ability to control urine  []Reduced ability to control bowel movements   []Reduced ability to ambulate prolonged functional periods/distances/surfaces  [x]Reduced ability to squat   [x]Reduced ability to forward bend  []Reduced ability to ascend/descend stairs  []Reduced ability to tolerate penetration/intercourse    Participation Restrictions  []Reduced participation in self care activities  [x]Reduced participation in home management activities  []Reduced participation in work activities  [x]Reduced participation in social activities  [x]Reduced participation in sport/recreational activities    Classification/Subgrouping:  []signs/symptoms consistent vaginismus/dyspareunia    []signs/symptoms consistent with pelvic floor organ prolapse  [x]signs/symptoms consistent with stress urinary incontinence  [x]signs/symptoms consistent with urge urinary incontinence  []signs/symptoms consistent with bowel incontinence  []signs/symptoms consistent with post-surgical status including decreased ROM, strength and function  [x]signs/symptoms consistent with other: urinary urgency and frequency with overactive bladder      Prognosis/Rehab Potential:      []Excellent   [x]Good    []Fair   []Poor    Tolerance of evaluation/treatment: []Excellent   [x]Good    []Fair   []Poor    Physical Therapy Evaluation Complexity Justification  [x] A history of present problem with:  [] no personal factors and/or comorbidities that impact the plan of care;  []1-2 personal factors and/or comorbidities that impact the plan of care  [x]3 personal factors and/or comorbidities that impact the plan of care  [x] An examination of body systems using standardized tests and measures addressing any of the following: body structures and functions (impairments), activity limitations, and/or participation restrictions;:  [] a total of 1-2 or more elements   [x] a total of 3 or more elements   [] a total of 4 or more elements   [x] A clinical presentation with:  [] stable and/or uncomplicated characteristics   [x] evolving clinical presentation with changing characteristics  [] unstable and unpredictable characteristics;   [x] Clinical decision making of [] low, [x] moderate, [] high complexity using standardized patient assessment instrument and/or measurable assessment of functional outcome. [] EVAL (LOW) 10184 (typically 20 minutes face-to-face)  [x] EVAL (MOD) 46249 (typically 30 minutes face-to-face)  [] EVAL (HIGH) 26803 (typically 45 minutes face-to-face)  [] RE-EVAL       PLAN:   Frequency/Duration:     Recommend see patient every ~1-2 weeks initially to perform tranvaginal PF muscle assessment and manual intervention as deemed necessary then to ensure patient is performing exercises for pelvic floor, hip and core stability correctly. Taper as appropriate, determining frequency of treatments based on progress, for a total of ~8-10 sessions.  Next scheduled appointment is on 3/1 at 10AM.    Interventions:  [x]  Therapeutic exercise including: strength training, ROM, and functional mobility for joint, spine, core, and pelvic floor   [x]  NMR activation and proprioception for abdominals, pelvic floor musculature activation and coordination, and posture retraining [x]  Manual therapy as indicated for spine, ribs, soft tissue, and pelvic floor to include: IASTM with or without dilator, STM, PROM, Gr I-IV mobilizations   [x] Modalities as needed that may include: E-stim, Biofeedback as indicated  [x] Patient education on pelvic floor anatomy and function, bladder and bowel anatomy and function, joint protection, postural re-education, activity modification, progression of HEP. Treatment Interventions Implemented    Exercise/Neuromuscular Re-education - Written HEP instructions provided and reviewed    Diaphragmatic breathing - coordinating with pelvic floor muscle activities - tactile cues on stomach - cues for PF descent with inhalation and elevation with exhalation duirng attempts for PF contraction     PF muscle control exercises -  supine - transvaginal feedback - QUICK FLICKS - 10 reps with cues to use for urge suppression and may practice for exercise ~2 times/day    Manual Interventions -   Patient verbally consented to transvaginal PF muscle assessment and intervention. Superficial external myofascial release and massage to transverse perineal and external anal sphincter muscles with moderate tenderness and tension/tightness. External clock - moderate tightness and mild tenderness noted in transverse perineal and perineal body  Transvaginal myofascial release and stretching of superficial, middle, and deep layers of PF muscles with focus on areas of increased tension and tissue resistance.   Internal clock             Superficial - moderate to severe tightness and tenderness with burning sensation with firm palpation at 5-6 o'clock position, burning subsided with release of palpation pressure             Middle - mild tightness and tenderness noted             Deep - mild to moderated tightness and tenderness noted  Focus on transvaginal techniques focused on gentle stretching in most superficial layer of PF muscles and coaching and transvaginal feedback for more effective isolated contractions of PF muscles with focus on quick flicks due to functionality with urge suppression techniques. Patient Education -   Extensive education on anatomy of pelvis including PF muscles and pelvic organs. Emphasized need for stretching and down training of tight muscles and strengthening of weak muscles to promote improve muscle balance in pelvis/low back and legs. Used PF model to demonstrate transvaginal PF muscle assessment to which patient verbally consented. Patient appeared to have better understanding of current bladder/bowel issues and improved outlook on situation by end of PF PT therapy session. Issued and reviewed handouts on , contributing factors to PF dysfunction, normal bladder health/bladder irritants, diaphragmatic breathing, quick flicks for urge suppression, over active bladder, constipation. Patient reports successful use of breathing and rocking techniques to improve bladder emptying when she urinated prior to transvaginal PF muscle assessment. GOALS:  Patient stated goal: \"less trips to the bathroom especially during the night (3-4 time)\" - gets up early every day and needs to take a nap in the afternoon since she is not well rested  [] Progressing: [] Met: [] Not Met: [] Adjusted      Therapist goals for Patient:     Short Term Goals: To be achieved in: 4-5 sessions    1. Patient will have a decrease in urination frequency and urgency to indicate improvement in pelvic floor strength and relaxation, muscle coordination, and/or bladder retraining. [] Progressing: [] Met: [] Not Met: [] Adjusted  2. Perform HEP for pelvic floor and core muscle groups with minimal direction from therapist as she progresses to become more independent managing her pelvic pressure and PF muscle weakness and/or tightness. [] Progressing: [] Met: [] Not Met: [] Adjusted  3.  Report using 1-2 behavioral modification strategies to reduce urinary/bowel complaints through dietary and mechanical changes, with focus on full emptying of bladder with each urination and using optimal positioning and deep breathing for relaxation of posterior PF muscles during defacation, reducing need to strain. [] Progressing: [] Met: [] Not Met: [] Adjusted    Long Term Goals: To be achieved in: 8-10 sessions    1. Improve score of quality of life index measure, PFDI-20, to 50 or less (initial eval - 71.88) disability index to assist with reaching prior level of function and demonstrating improved quality of life with less life interruptions due to urinary urgency, frequency, and incontinence allowing patient to fully participate in socially appropriate activities, including caring for spouse and exercising. [] Progressing: [] Met: [] Not Met: [] Adjusted  2. Patient will increase PERF score to 3/5 to demonstrate increased strength and control over pelvic floor musculature. [] Progressing: [] Met: [] Not Met: [] Adjusted  3. Patient will return to functional activities including household chores and moderate level of exercise without increased symptoms or restriction. [] Progressing: [] Met: [] Not Met: [] Adjusted  4. Patient will get up to urinate only 2 or less times each night consistently. (up 3-4 times/night at initial eval)   [] Progressing: [] Met: [] Not Met: [] Adjusted   5. Report using 3-4 behavioral modification strategies to reduce urinary/bowel complaints through dietary and mechanical changes, with focus on full emptying of bladder with each urination and using optimal positioning and deep breathing for relaxation of posterior PF muscles during defacation, reducing need to strain. [] Progressing: [] Met: [] Not Met: [] Adjusted   6. Rate severity of the problem related to urinary  frequency/urgency/incontinence to 3-4 or less on scale of 0-10 with 0 being no problem and 10 being the worst - (8/10 reported at intial eval). [] Progressing: [] Met: [] Not Met: [] Adjusted   7. Perform HEP for pelvic floor and core muscle groups independently as she progresses to self-manage her pelvic pressure and PF muscle weakness and/or tightness. [] Progressing: [] Met: [] Not Met: [] Adjusted     Electronically signed by:  Lindsey Rodriguez, PT #[id number]      Medicare Cap (if applicable):    $280.83 = total amount used, updated 2/23/2022    Time in: 1025   Time Out:1210  Total Treatment Time: 105 minutes  Coded Treatment Time: 90 minutes    Note: If patient does not return for scheduled/recommended follow up visits, this note will serve as a discharge from care along with the most recent update on progress.

## 2022-02-23 ENCOUNTER — HOSPITAL ENCOUNTER (OUTPATIENT)
Dept: PHYSICAL THERAPY | Age: 77
Setting detail: THERAPIES SERIES
Discharge: HOME | End: 2022-02-23
Payer: MEDICARE

## 2022-02-23 PROCEDURE — 97140 MANUAL THERAPY 1/> REGIONS: CPT | Performed by: PHYSICAL THERAPIST

## 2022-02-23 PROCEDURE — 97530 THERAPEUTIC ACTIVITIES: CPT | Performed by: PHYSICAL THERAPIST

## 2022-02-23 PROCEDURE — 97110 THERAPEUTIC EXERCISES: CPT | Performed by: PHYSICAL THERAPIST

## 2022-02-23 PROCEDURE — 97162 PT EVAL MOD COMPLEX 30 MIN: CPT | Performed by: PHYSICAL THERAPIST

## 2022-02-24 NOTE — PROGRESS NOTES
[facility]  [address] [place] [address]  Phone: [phone]  Fax: [phone]        Physical Therapy Daily Treatment Note    Date: 3/1/2022    Patient Name: Elizabeth Robles : 1945 MRN: [id number]    Restrictions/Precautions:    Medical/Treatment Diagnosis Information:    · Diagnosis: N32.81 - OAB (overactive bladder)    Insurance/Certification information: PT Insurance Information: Medicare    Physician Information: Referring Practitioner: Dixie Goff MD    Plan of care signed (Y/N): Y  Cosigned by: Bandar Wan MD at 2022  7:49 AM       Visit# / total visits: 2/10+    Medicare Cap (if applicable):    $329.90 = total amount used, updated 3/1/2022    Time in: 1005  Time Out: 1105  Total Treatment Time: 60 minutes    ________________________________________________________________________________________    Pain Level: denies    SUBJECTIVE:   Patient reports that her stress in high - spouse in having his surgery tomorrow but did fall in driveway just after to patient return home from her last PF PT appointment/eval - significant injuries to his face, but did not have any brain injury. Patient reports feeling better since initial PF PT visit - has been to start with her stretches which she was performing prior to shutdown of gym due to [employer]. Waking up to urine about 1-3 times/night - satisfied with being able to go about 4 hours during the night without need to urinate. Has made some behavioral modifications in regards to fluid intake. Patient reports that her bowels have been more regular, having BM almost every day - taking Miralax , but hopeful that increasing her activity makes her less reliant on Miralax. Urinating about every 2 hours during the day, intermittently using quick flicks for urge suppression rich to delay need to get up through the night.      OBJECTIVE:    Treatment Interventions Implemented Exercise/Neuromuscular Re-education - Written HEP instructions provided and reviewed     Diaphragmatic breathing - coordinating with pelvic floor muscle activities - tactile cues on stomach - cues for PF descent with inhalation      PF muscle control exercises -  supine - transvaginal feedback - QUICK FLICKS - 10 reps with cues to use for urge suppression and may practice for exercise ~2 times/day - reviewed technique and effective/appropriate use for urge suppression    Patient demonstrated following exercise for her lower back which she added back in since her first PF PT visit and one that she was performing prior to having her recent issues with bladder control - suggested slight longer hold and fewer reps that previous with focus more on breathing and allowing tension to release during stretches. Realized that when she returned to gym the bench she used for these exercises was removed, so she quit doing these exercises for her lower back. This was all about the time that her bladder issues presented.    Hamstring stretch - seated on edge of mat with one leg extend and trunk flex - hold 1 minute = 10 slow deep breaths x 2-3 reps LE- cues to look straight ahead - unable to hold arms up as reaching forward, so instructed to let arms rest on bed but look straight ahead to maintain more upright trunk  Single knee to chest - hold 1 minute = 10 slow deep breaths x 2-3 reps each LE  Double knee to chest - hold ~ 2+ minutes while performing exercises with her ankles to end her typical therapy routine   Lateral thigh/piriformis stretch - one leg crossed over the other in supine -   hold for 1 minute = 10 slow, deep breaths s 2-3 reps - slight modifications made and combined with other stretch of lateral thigh with more hip flex  Strengthening -   Side lying - hip abd strengthening against gravity with knee extended- hold 4 sec x 10 reps - each LE   Side lying - hip adb with knee and hip flexed /clam shells - hold 4 sec x 10 reps - each LE    Added-  Piriformis stretch - figure four supine and then sitting - hold 8-10 breaths/~1 minute x 2 reps each LE - reports increased tension in L buttocks compared to R - felt better stretch in supine compared to sitting position  Posterior pelvic tilts - hold for 5-10 seconds x 10 reps - tactile and verbal cues to activate TA and to avoid clenching of PF muscles      Manual Interventions - Deferred on 3/1/2022 due to focus on stretches and core stabilization activities  Last assessed on 2/23/2022 -   Patient verbally consented to transvaginal PF muscle assessment and intervention. Superficial external myofascial release and massage to transverse perineal and external anal sphincter muscles with moderate tenderness and tension/tightness. External clock - moderate tightness and mild tenderness noted in transverse perineal and perineal body  Transvaginal myofascial release and stretching of superficial, middle, and deep layers of PF muscles with focus on areas of increased tension and tissue resistance. Internal clock             Superficial - moderate to severe tightness and tenderness with burning sensation with firm palpation at 5-6 o'clock position, burning subsided with release of palpation pressure             Middle - mild tightness and tenderness noted             Deep - mild to moderated tightness and tenderness noted  Focus on transvaginal techniques focused on gentle stretching in most superficial layer of PF muscles and coaching and transvaginal feedback for more effective isolated contractions of PF muscles with focus on quick flicks due to functionality with urge suppression techniques.         Patient Education -   Extensive education on anatomy of pelvis including PF muscles and pelvic organs. Emphasized need for stretching and down training of tight muscles and strengthening of weak muscles to promote improve muscle balance in pelvis/low back and legs.  Used PF model to demonstrate transvaginal PF muscle assessment to which patient verbally consented. Patient appeared to have better understanding of current bladder/bowel issues and improved outlook on situation by end of PF PT therapy session.  Issued and reviewed handouts on , contributing factors to PF dysfunction, normal bladder health/bladder irritants, diaphragmatic breathing, quick flicks for urge suppression, over active bladder, constipation.   Patient reports successful use of breathing and rocking techniques to improve bladder emptying when she urinated prior to transvaginal PF muscle assessment. Reviewed and advance stretches and strengthen activities for core, lower back and PF. Reviewed anatomy and relationship of piriformis muscle to sciatic and perineal nerves and how compression due to tightness in her muscles could be contributing to her current bladder issues. ASSESSMENT:  Patient reports moderate improvements after implementing several suggested behavioral modifications and initiating an exercise routine that she had been taught to address her lower back pain several years ago. Despite the added stress of her 's fall and upcoming surgery, patient has been able to appreciate some decrease in urinary urgency and frequency. With additional education, stretches of muscles in and around pelvis, core stabilization activities, and continued manual interventions, anticipate continued improvement in patient's bladder symptoms.    Patient would definitely benefit from pelvic floor physical therapy for manual interventions, continued education on healthy bladder/bowel habits and adjustment of behavioral modifications as well as advanced activities to improve muscle control/ coordination and endurance of pelvic floor, core, and hip muscles to decrease urinary frequency, urgency and occasional stress incontinence, as well as constipation which may be contributing to her bladder symptoms. Treatment/Activity Tolerance:    Patient tolerated treatment well   Reports significant improvement in overall muscle flexibility since returning to her stretches. Demonstrates understanding of relationship of tight muscles surrounding the pelvis/pelvic floor and increased tension of her bladder with increased sensitivity and thus urinary urgency and frequency. Goals:  GOALS:  Patient stated goal: \"less trips to the bathroom especially during the night (3-4 time)\" - gets up early every day and needs to take a nap in the afternoon since she is not well rested  [x]? Progressing: []? Met: []? Not Met: []? Adjusted        Therapist goals for Patient:      Short Term Goals: To be achieved in: 4-5 sessions     1. Patient will have a decrease in urination frequency and urgency to indicate improvement in pelvic floor strength and relaxation, muscle coordination, and/or bladder retraining. [x]? Progressing: []? Met: []? Not Met: []? Adjusted  2. Perform HEP for pelvic floor and core muscle groups with minimal direction from therapist as she progresses to become more independent managing her pelvic pressure and PF muscle weakness and/or tightness. [x]? Progressing: []? Met: []? Not Met: []? Adjusted  3. Report using 1-2 behavioral modification strategies to reduce urinary/bowel complaints through dietary and mechanical changes, with focus on full emptying of bladder with each urination and using optimal positioning and deep breathing for relaxation of posterior PF muscles during defacation, reducing need to strain. [x]? Progressing: []? Met: []? Not Met: []? Adjusted     Long Term Goals: To be achieved in: 8-10 sessions     1.  Improve score of quality of life index measure, PFDI-20, to 50 or less (initial eval - 71.88) disability index to assist with reaching prior level of function and demonstrating improved quality of life with less life interruptions due to urinary urgency, frequency, and incontinence allowing patient to fully participate in socially appropriate activities, including caring for spouse and exercising. []? Progressing: []? Met: []? Not Met: []? Adjusted  2. Patient will increase PERF score to 3/5 to demonstrate increased strength and control over pelvic floor musculature. []? Progressing: []? Met: []? Not Met: []? Adjusted  3. Patient will return to functional activities including household chores and moderate level of exercise without increased symptoms or restriction. []? Progressing: []? Met: []? Not Met: []? Adjusted  4. Patient will get up to urinate only 2 or less times each night consistently. (up 3-4 times/night at initial eval)   []? Progressing: []? Met: []? Not Met: []? Adjusted            5. Report using 3-4 behavioral modification strategies to reduce urinary/bowel complaints through dietary and mechanical changes, with focus on full emptying of bladder with each urination and using optimal positioning and deep breathing for relaxation of posterior PF muscles during defacation, reducing need to strain. []? Progressing: []? Met: []? Not Met: []? Adjusted            6. Rate severity of the problem related to urinary  frequency/urgency/incontinence to 3-4 or less on scale of 0-10 with 0 being no problem and 10 being the worst - (8/10 reported at intial eval). []? Progressing: []? Met: []? Not Met: []? Adjusted            7. Perform HEP for pelvic floor and core muscle groups independently as she progresses to self-manage her pelvic pressure and PF muscle weakness and/or tightness. []? Progressing: []? Met: []? Not Met: []? Adjusted                        Plan:   Continue per plan of care   Reassess transvaginal PF muscle tightness and address accordingly with manual techniques. Review stretches and advance core stabilization activities as able.      Frequency/Duration:     Recommend see patient every ~1-2 weeks initially to perform tranvaginal PF muscle assessment and intervention as deemed necessary then to ensure patient is performing exercises for pelvic floor, hip and core stability correctly. Taper as appropriate, determining frequency of treatments based on progress, for a total of ~8-10 sessions. Patient's next scheduled appointment is on 3/16 at [place] [id number].  Electronically signed by Bao Gtz, PT #[id number] on 3/1/2022 at 12:51 PM    Note: If patient does not return for scheduled/recommended follow up visits, this note will serve as a discharge from care along with the most recent update on progress.

## 2022-03-01 ENCOUNTER — HOSPITAL ENCOUNTER (OUTPATIENT)
Dept: PHYSICAL THERAPY | Age: 77
Setting detail: THERAPIES SERIES
Discharge: HOME | End: 2022-03-01
Payer: MEDICARE

## 2022-03-01 PROCEDURE — 97110 THERAPEUTIC EXERCISES: CPT | Performed by: PHYSICAL THERAPIST

## 2022-03-01 PROCEDURE — 97530 THERAPEUTIC ACTIVITIES: CPT | Performed by: PHYSICAL THERAPIST

## 2022-03-10 NOTE — PROGRESS NOTES
[address] [facility] [address]  Phone: [phone]  Fax: [phone]        Physical Therapy Daily Treatment Note    Date: 3/16/2022    Patient Name: Elizabeth Robles : 1945 MRN: [id number]    Restrictions/Precautions:    Medical/Treatment Diagnosis Information:    · Diagnosis: N32.81 - OAB (overactive bladder)    Insurance/Certification information: PT Insurance Information: Medicare    Physician Information: Referring Practitioner: Dixie Goff MD    Plan of care signed (Y/N): Y  Cosigned by: Bandar Wan MD at 2022  7:49 AM       Visit# / total visits: 3/10+    Medicare Cap (if applicable):    $743.96 = total amount used, updated 3/16/2022    Time in: 1525  Time Out: 1635  Total Treatment Time: 70 minutes    ________________________________________________________________________________________    Pain Level: denies    SUBJECTIVE:   Patient reports that her spouse is doing well and able to complete his surgery. Patient reports issues with daughter's family continue but there is only so much she is able to do about this. Patient reports that she a pain management appointment in about 6 months - but so far back and neck are doing as well as expected, continues with pain in her arms and shoulders - seeing ortho at the end of April. Patient reports mostly getting up 2x/night, as little as once a night and infrequently up to 3X/night - realizes that she can not eat an apple later in the evening since this made her have to urinate more frequently at night. Limiting fluids in the evening to sleep better. On average gets ~7 hours of sleep. Performing stretches about every other day. Patient reports that her bowels have been more regular, having BM almost every day - taking Miralax, but hopeful that increasing her activity makes her less reliant on Miralax.    Urinating about every 2 hours during the day, intermittently using quick flicks for urge suppression and was able to hold closer to 3 hours while out shopping. Patient reports that she sees Dr. Jacob Kessler next week and has a follow up test.  Since her bladder control issues are improving without medication, she is hopeful for her next PF PT session to be her last.      OBJECTIVE:    Treatment Interventions Implemented     Exercise/Neuromuscular Re-education - Written HEP instructions provided and reviewed     Diaphragmatic breathing - coordinating with pelvic floor muscle activities - tactile cues on stomach - cues for PF descent with inhalation - EXHALATION WITH EXERTIONAL/LIFTING ACTIVITIES     PF muscle control exercises -  supine - transvaginal feedback - QUICK FLICKS - 10 reps with cues to use for urge suppression and may practice for exercise ~2 times/day - reviewed technique and effective/appropriate use for urge suppression    Patient demonstrated following exercise for her lower back which she added back in since her first PF PT visit and one that she was performing prior to having her recent issues with bladder control - suggested slightly longer hold and fewer reps that previous with focus more on breathing and allowing tension to release during stretches. Realized that when she returned to gym the bench she used for these exercises was removed, so she quit doing these exercises for her lower back. This was all about the time that her bladder issues presented.    Hamstring stretch - seated on edge of mat with one leg extend and trunk flex - hold 1 minute = 10 slow deep breaths x 2-3 reps LE- cues to look straight ahead - unable to hold arms up as reaching forward, so instructed to let arms rest on bed but look straight ahead to maintain more upright trunk - finds lying down and performing 90/90 hamstring stretches work best for her  Single knee to chest - hold 1 minute = 10 slow deep breaths x 2-3 reps each LE  Double knee to chest - hold ~ 2+ minutes while performing exercises with her ankles to end her typical therapy routine   Lateral thigh/piriformis stretch - one leg crossed over the other in supine -   hold for 1 minute = 10 slow, deep breaths s 2-3 reps - slight modifications made and combined with other stretch of lateral thigh with more hip flex  Strengthening -   Side lying - hip abd strengthening against gravity with knee extended- hold 4 sec x 10 reps - each LE   Side lying - hip adb with knee and hip flexed /clam shells - hold 4 sec x 10 reps - each LE    Piriformis stretch - figure four supine and then sitting - hold 8-10 breaths/~1 minute x 2 reps each LE - reports increased tension in L buttocks compared to R - felt better stretch in supine compared to sitting position - prefers lying down to sitting   Reviewed -   Posterior pelvic tilts - hold for 5-10 seconds x 10 reps - tactile and verbal cues to activate TA and to avoid clenching of PF muscles  Added -   Core stabilization activities - adding alternating shoulder flex - but unable due to increased shoulder/arm pain, alternating hip flex, the alternating bringing same hand to same knee with hip flex/shoulder ext toward knee x 10 reps while maintaining TA contraction with ppt. - cues to exhale with each exertion/lift  Straight leg raise x 10 reps each LE - cues for core stabilization prior to leg lift and exhalation with exertion/lift        Manual Interventions -     Patient verbally consented to transvaginal PF muscle assessment and intervention, however seems to prefer to manage her bladder control issues without addressing the tissue level abnormalities which persist.    Superficial external myofascial release and massage to transverse perineal and external anal sphincter muscles:  External clock - moderate tightness and mild tenderness noted in transverse perineal and perineal body  Transvaginal myofascial release and stretching of superficial, middle, and deep layers of PF muscles with focus on areas of increased tension and tissue resistance. Internal clock             Superficial - moderate tightness and tenderness with burning sensation with firm palpation at 5-6 o'clock position, burning subsided with release of palpation pressure             Middle/Deep - R moderate tightness and tenderness noted with initially burning pain but only pressure after bimanual release performed, deferred assessing L due to patient's discomfort               Bimanual myofascial release simultaneously with internal/transvaginal on PF muscles and external on buttocks/later thigh, inner thigh, and abdomen to determine changes in tissue tension with slacking and stretching of the fascia. Focus on R side and deferred L due to patient's preference.           Patient Education -   Extensive education on anatomy of pelvis including PF muscles and pelvic organs. Emphasized need for stretching and down training of tight muscles and strengthening of weak muscles to promote improve muscle balance in pelvis/low back and legs. Used PF model to demonstrate transvaginal PF muscle assessment to which patient verbally consented. Patient appeared to have better understanding of current bladder/bowel issues and improved outlook on situation by end of PF PT therapy session.  Issued and reviewed handouts on , contributing factors to PF dysfunction, normal bladder health/bladder irritants, diaphragmatic breathing, quick flicks for urge suppression, over active bladder, constipation.   Patient reports successful use of breathing and rocking techniques to improve bladder emptying when she urinated prior to transvaginal PF muscle assessment. Reviewed reasoning for advancing stretches and strengthen activities for core, lower back and PF, specifically for core stabilization prior to any and all activities and exhaling on exertion.    Reviewed anatomy and relationship of piriformis muscle to sciatic and perineal nerves and how compression due to tightness in her muscles could be contributing to her current bladder issues. Issued and reviewed booklet on body mechanics, emphasizing need for TA contraction prior to and during lifting activities and exhalation on exertion with all functional activities and exercises. Educated on benefits of continued PF PT for direct manual techniques/interventions to tight tissues noted with transvaginal assessment, however patient seems to be satisfied with her current improvements in regards to bladder control. Patient has moderate to severe burning pain with palpation and treatment in and around the hypersensitive area at 5-6 o'clock in her superficial PF muscles, and does not seem interested in pursuing further transvaginal interventions despite potential benefits. Educated patient that this PF muscle/tissue tightness could be the underlying cause of not only her bladder issues, but also her lower back and other nerve/pain control issues. ASSESSMENT:  Patient continues to report moderate improvements with bladder control after implementing several suggested behavioral modifications and performing stretches and strengthening exercise routine that we reviewed and modified last session. Despite continued stress revolving around her daughter's family,  patient has been able to appreciate some decrease in urinary urgency and frequency, generally waking up to urinate about twice a night, sometimes less, and being able to hold urine up to 3 hours when out shopping with use of quick flicks for urge suppression. With additional education, stretches of muscles in and around pelvis, core stabilization activities, and continued manual interventions, anticipate continued improvement in patient's bladder symptoms. However, patient seems fairly satisfied with her progress and stated that she is not interested in additional transvaginal PF interventions despite their potential benefits.   Patient is planning to see Dr. Kaylen Roth for a follow up and bladder testing next week and was agreeable to scheduling one more follow up for PF PT after that to ensure that she has not additional questions or concerns. Patient could benefit from pelvic floor physical therapy for manual interventions, continued education on healthy bladder/bowel habits and adjustment of behavioral modifications as well as advanced activities to improve muscle control/ coordination and endurance of pelvic floor, core, and hip muscles to decrease urinary frequency, urgency and occasional stress incontinence, as well as constipation which may be contributing to her bladder symptoms. However, patient is fairly certain that she will continue with her home program and discontinue PF PT after next session. Treatment/Activity Tolerance:    Patient tolerated treatment well   Reports significant improvement in overall muscle flexibility since performing stretches more consistently. Demonstrates understanding of relationship of tight muscles surrounding the pelvis/pelvic floor and increased tension of her bladder with increased sensitivity and thus urinary urgency and frequency. Patient reported significant discomfort/burning pain with palpation to superficial tissue of PF muscles and even initially with palpation to middle/deep PF muscles on R. Patient did have relief of burning pain after manual technique to release R buttock externally with internal release of obturator internus, but did not wish to pursue any additional transvaginal treatment so did not assess or treat L middle/deep PF muscles. Patient continued with burning pain with palpation of the superficial PF muscles with appear to be tight, possibly scar tissue, that is hypersensitive to touch.         Goals:  GOALS:  Patient stated goal: \"less trips to the bathroom especially during the night (3-4 time)\" - gets up early every day and needs to take a nap in the afternoon since she is not well rested  []? Progressing: [x]? Met: []? Not Met: []? Adjusted        Therapist goals for Patient:      Short Term Goals: To be achieved in: 4-5 sessions     1. Patient will have a decrease in urination frequency and urgency to indicate improvement in pelvic floor strength and relaxation, muscle coordination, and/or bladder retraining.  []? Progressing: [x]? Met: []? Not Met: []? Adjusted   2. Perform HEP for pelvic floor and core muscle groups with minimal direction from therapist as she progresses to become more independent managing her pelvic pressure and PF muscle weakness and/or tightness. []? Progressing: [x]? Met: []? Not Met: []? Adjusted  3. Report using 1-2 behavioral modification strategies to reduce urinary/bowel complaints through dietary and mechanical changes, with focus on full emptying of bladder with each urination and using optimal positioning and deep breathing for relaxation of posterior PF muscles during defacation, reducing need to strain. []? Progressing: [x]? Met: []? Not Met: []? Adjusted     Long Term Goals: To be achieved in: 8-10 sessions     1. Improve score of quality of life index measure, PFDI-20, to 50 or less (initial eval - 71.88) disability index to assist with reaching prior level of function and demonstrating improved quality of life with less life interruptions due to urinary urgency, frequency, and incontinence allowing patient to fully participate in socially appropriate activities, including caring for spouse and exercising. []? Progressing: []? Met: []? Not Met: []? Adjusted  2. Patient will increase PERF score to 3/5 to demonstrate increased strength and control over pelvic floor musculature. []? Progressing: []? Met: []? Not Met: []? Adjusted  3. Patient will return to functional activities including household chores and moderate level of exercise without increased symptoms or restriction. []? Progressing: []? Met: []? Not Met: []? Adjusted  4.  Patient will get up to urinate only 2 or less times each night consistently. (up 3-4 times/night at initial eval)   []? Progressing: []? Met: []? Not Met: []? Adjusted            5. Report using 3-4 behavioral modification strategies to reduce urinary/bowel complaints through dietary and mechanical changes, with focus on full emptying of bladder with each urination and using optimal positioning and deep breathing for relaxation of posterior PF muscles during defacation, reducing need to strain. []? Progressing: []? Met: []? Not Met: []? Adjusted            6. Rate severity of the problem related to urinary  frequency/urgency/incontinence to 3-4 or less on scale of 0-10 with 0 being no problem and 10 being the worst - (8/10 reported at intial eval). []? Progressing: []? Met: []? Not Met: []? Adjusted            7. Perform HEP for pelvic floor and core muscle groups independently as she progresses to self-manage her pelvic pressure and PF muscle weakness and/or tightness. []? Progressing: []? Met: []? Not Met: []? Adjusted                        Plan:   Continue per plan of care   Defer any further transvaginal PF muscle assessment and interventions per patient's request - does not wish to address tightness and tenderness with direct, transvaginal manual techniques. Review stretches and core stabilization activities as able. Ensure patient is adequately strengthening her LE muscles. Frequency/Duration:     Patient has made sufficient progress to her expectations in regards to bladder control and does not wish to pursue additional manual interventions to the hypersenitive and tight tissues in her superficial PF which may, at least in part, be contributing to her symptoms. Patient agreed to one more follow up appointment with PF PT in about 3 weeks, after following up with Dr. Cristy Cao next week.   Patient's next scheduled appointment is on 4/6 at 11AM.  Will discuss possible discharge from PF PT if patient remains satisfied with her results. Electronically signed by Rebel Yuan, PT #[id number] on 3/16/2022 at 2:46 PM    Note: If patient does not return for scheduled/recommended follow up visits, this note will serve as a discharge from care along with the most recent update on progress.

## 2022-03-16 ENCOUNTER — HOSPITAL ENCOUNTER (OUTPATIENT)
Dept: PHYSICAL THERAPY | Age: 77
Setting detail: THERAPIES SERIES
Discharge: HOME | End: 2022-03-16
Payer: MEDICARE

## 2022-03-16 ENCOUNTER — APPOINTMENT (OUTPATIENT)
Dept: PHYSICAL THERAPY | Age: 77
End: 2022-03-16
Payer: MEDICARE

## 2022-03-16 PROCEDURE — 97530 THERAPEUTIC ACTIVITIES: CPT | Performed by: PHYSICAL THERAPIST

## 2022-03-16 PROCEDURE — 97140 MANUAL THERAPY 1/> REGIONS: CPT | Performed by: PHYSICAL THERAPIST

## 2022-03-16 PROCEDURE — 97110 THERAPEUTIC EXERCISES: CPT | Performed by: PHYSICAL THERAPIST

## 2022-03-23 ENCOUNTER — PROCEDURE VISIT (OUTPATIENT)
Dept: UROGYNECOLOGY | Age: 77
End: 2022-03-23
Payer: MEDICARE

## 2022-03-23 VITALS
RESPIRATION RATE: 14 BRPM | OXYGEN SATURATION: 96 % | DIASTOLIC BLOOD PRESSURE: 83 MMHG | TEMPERATURE: 98 F | SYSTOLIC BLOOD PRESSURE: 162 MMHG | HEART RATE: 66 BPM

## 2022-03-23 DIAGNOSIS — R35.0 URINARY FREQUENCY: ICD-10-CM

## 2022-03-23 DIAGNOSIS — R39.15 URINARY URGENCY: ICD-10-CM

## 2022-03-23 DIAGNOSIS — M51.26 DISPLACEMENT OF LUMBAR INTERVERTEBRAL DISC WITHOUT MYELOPATHY: Primary | ICD-10-CM

## 2022-03-23 DIAGNOSIS — N32.81 OAB (OVERACTIVE BLADDER): ICD-10-CM

## 2022-03-23 PROCEDURE — 1123F ACP DISCUSS/DSCN MKR DOCD: CPT | Performed by: OBSTETRICS & GYNECOLOGY

## 2022-03-23 PROCEDURE — 1090F PRES/ABSN URINE INCON ASSESS: CPT | Performed by: OBSTETRICS & GYNECOLOGY

## 2022-03-23 PROCEDURE — G8427 DOCREV CUR MEDS BY ELIG CLIN: HCPCS | Performed by: OBSTETRICS & GYNECOLOGY

## 2022-03-23 PROCEDURE — 1036F TOBACCO NON-USER: CPT | Performed by: OBSTETRICS & GYNECOLOGY

## 2022-03-23 PROCEDURE — G8484 FLU IMMUNIZE NO ADMIN: HCPCS | Performed by: OBSTETRICS & GYNECOLOGY

## 2022-03-23 PROCEDURE — G8417 CALC BMI ABV UP PARAM F/U: HCPCS | Performed by: OBSTETRICS & GYNECOLOGY

## 2022-03-23 PROCEDURE — 52285 CYSTOSCOPY AND TREATMENT: CPT | Performed by: OBSTETRICS & GYNECOLOGY

## 2022-03-23 PROCEDURE — 4040F PNEUMOC VAC/ADMIN/RCVD: CPT | Performed by: OBSTETRICS & GYNECOLOGY

## 2022-03-23 PROCEDURE — G8400 PT W/DXA NO RESULTS DOC: HCPCS | Performed by: OBSTETRICS & GYNECOLOGY

## 2022-03-23 PROCEDURE — 3017F COLORECTAL CA SCREEN DOC REV: CPT | Performed by: OBSTETRICS & GYNECOLOGY

## 2022-03-23 ASSESSMENT — ENCOUNTER SYMPTOMS: BACK PAIN: 1

## 2022-03-23 NOTE — PROGRESS NOTES
3/23/2022     HPI:     Name: Libia Ornelas  YOB: 1945    CC: Libia Ornelas is a 77 y.o. female presenting for an evaluation of urge incontinence . HPI: How long have you had this problem? Please rate the severity of your problem: mild  Anything make it better? PFPT is helping a lot, she does not feel that she needs any medication. Ob/Gyn History:    OB History    Para Term  AB Living   2 2 2     2   SAB IAB Ectopic Molar Multiple Live Births             2      # Outcome Date GA Lbr Chase/2nd Weight Sex Delivery Anes PTL Lv   2 Term      Vag-Spont   CLEOPATRA   1 Term      Vag-Spont   CLEOPATRA     Past Medical History:   Past Medical History:   Diagnosis Date    Arthritis     Breast cancer ([facility] [address]     High cholesterol     Hypertension     Sciatica      Past Surgical History:   Past Surgical History:   Procedure Laterality Date    BREAST BIOPSY Left  &     BREAST LUMPECTOMY Left  &     BUNIONECTOMY Right     KNEE SURGERY  2015     Current Medications:  Current Outpatient Medications   Medication Sig Dispense Refill    lisinopril (PRINIVIL;ZESTRIL) 10 MG tablet Take 10 mg by mouth daily      simvastatin (ZOCOR) 40 MG tablet Take 40 mg by mouth nightly      aspirin 81 MG tablet Take 81 mg by mouth daily      Misc Natural Products (GLUCOSAMINE CHONDROITIN TRIPLE PO) Take by mouth      Omega-3 Fatty Acids (FISH OIL) 1200 MG CAPS Take by mouth      FIBER SELECT GUMMIES PO Take by mouth      calcium carbonate 600 MG TABS tablet Take 1 tablet by mouth daily (Patient not taking: Reported on 3/23/2022)      Naproxen Sodium (ALEVE PO) Take by mouth as needed (Patient not taking: Reported on 2022)      acetaminophen (TYLENOL) 325 MG tablet Take 650 mg by mouth every 6 hours as needed for Pain (Patient not taking: Reported on 2022)       No current facility-administered medications for this visit. Allergies:    Allergies   Allergen Reactions    Celebrex [Celecoxib]     Gabapentin      dizzy    Valium [Diazepam]      Social History:   Social History     Socioeconomic History    Marital status:      Spouse name: Not on file    Number of children: Not on file    Years of education: Not on file    Highest education level: Not on file   Occupational History    Not on file   Tobacco Use    Smoking status: Never Smoker    Smokeless tobacco: Never Used   Substance and Sexual Activity    Alcohol use: No    Drug use: Never    Sexual activity: Not on file   Other Topics Concern    Not on file   Social History Narrative    Not on file     Social Determinants of Health     Financial Resource Strain:     Difficulty of Paying Living Expenses: Not on file   Food Insecurity:     Worried About Running Out of Food in the Last Year: Not on file    Brittni of Food in the Last Year: Not on file   Transportation Needs:     Lack of Transportation (Medical): Not on file    Lack of Transportation (Non-Medical):  Not on file   Physical Activity:     Days of Exercise per Week: Not on file    Minutes of Exercise per Session: Not on file   Stress:     Feeling of Stress : Not on file   Social Connections:     Frequency of Communication with Friends and Family: Not on file    Frequency of Social Gatherings with Friends and Family: Not on file    Attends Confucianist Services: Not on file    Active Member of [address] [place] [address] [employer] or Organizations: Not on file    Attends Club or Organization Meetings: Not on file    Marital Status: Not on file   Intimate Partner Violence:     Fear of Current or Ex-Partner: Not on file    Emotionally Abused: Not on file    Physically Abused: Not on file    Sexually Abused: Not on file   Housing Stability:     Unable to Pay for Housing in the Last Year: Not on file    Number of [place] in the Last Year: Not on file    Unstable Housing in the Last Year: Not on file     Family History:   Family History   Problem Relation Age of Onset  Breast Cancer Mother     Cancer Mother         melanoma     Cancer Father         luekemia     Coronary Art Dis Father     Osteoarthritis Father     Parkinsonism Father     Ulcerative Colitis Father      Review of System  Review of Systems   Musculoskeletal: Positive for back pain and neck pain. Neurological: Positive for weakness. All other systems reviewed and are negative. A review of systems was done by the patient and reviewed by me. Objective:     Vital Signs  Vitals:    03/23/22 1215   BP: (!) 162/83   Pulse: 66   Resp: 14   Temp: 98 °F (36.7 °C)   SpO2: 96%      Physical Exam  Physical Exam  HENT:      Head: Normocephalic and atraumatic. Eyes:      Conjunctiva/sclera: Conjunctivae normal.   Pulmonary:      Effort: Pulmonary effort is normal.   Abdominal:      Palpations: Abdomen is soft. Musculoskeletal:      Cervical back: Normal range of motion and neck supple. Skin:     General: Skin is warm and dry. Neurological:      Mental Status: She is alert and oriented to person, place, and time. Procedure: The urethral was anesthesized with topical lidocaine jelly and dilated to a #14 [place]. A 0-degree urethroscope was used to examine the urethra. A 70-degree cystoscope was used to evaluate the bladder. FINDINGS:  1. Urethra was normal  2. 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 Trigone appeared Normal  4. Ureters illustrated bilateral efflux  5. Patient exhibited spasms during the study no      No results found for this visit on 03/23/22. Assessment/Plan:     Terri Coats is a 77 y.o. female with   1. Displacement of lumbar intervertebral disc without myelopathy    2. OAB (overactive bladder)    3. Urinary urgency    4. Urinary frequency    Old records reviewed, outside records reviewed, cystoscopy was reviewed    She has done very well with pelvic floor physical therapy. She was started on Vesicare the last time but had some side effects and we did review this today. We also reviewed continuing pelvic floor muscle exercises and she will follow up with us on an as-needed basis. Orders Placed This Encounter   Procedures    [place] CYSTOSCOPY,RX FEMALE URETHRAL SYND     No orders of the defined types were placed in this encounter.       Sheridan Moraes MD

## 2022-04-06 ENCOUNTER — APPOINTMENT (OUTPATIENT)
Dept: PHYSICAL THERAPY | Age: 77
End: 2022-04-06
Payer: MEDICARE

## 2022-04-07 NOTE — PROGRESS NOTES
[address] [facility]  Phone: [phone]   Fax: [phone]      Physical Therapy Discharge Summary  Pelvic Floor Physical Therapy     Dear Referring Practitioner: Lesvia Gtz MD,     We had the pleasure of treating the following patient for physical therapy services at [address] [place] [address]. A summary of our findings can be found in the discharge summary below. If you have any questions or concerns regarding these findings, please do not hesitate to contact me at the office phone number above. Thank you for the referral.      Date: 2022    Patient: Jostin Smith   : 1945   MRN: [id number]  Referring Physician: Referring Practitioner: Lesvia Gtz MD      Evaluation Date: 2022      Medical Diagnosis Information:  Diagnosis: N32.81 - OAB (overactive bladder)                                             Insurance information: PT Insurance Information: Medicare    Functional Outcome:              PFDI-20 Score: 0  Sub-sections:  POPDI-6 Score : 0;  CRADI-8 Score : 0;  MAGDI-6 Score : 0              Overall Response to Treatment:              [x]Patient is responding well to treatment and improvement is noted with regards  to goals              [x]Patient should continue to improve in reasonable time if they continue HEP              []Patient has plateaued and is no longer responding to skilled PT intervention                    []Patient is getting worse and would benefit from return to referring MD              []Patient unable to adhere to initial POC              []Other:      Date range of Visits: 22-22  Total Visits: 4     Recommendation:               [x] Discharge to [facility]. Follow up with PT or MD PRN. Please see last progress note below for discharge status when last seen on 22.          Electronically signed by:  Doris Gallardo [facility] - Outpatient Physical Therapy  [address]  Phone: [phone]  Fax: [phone]        Physical Therapy Daily Treatment Note    Date: 2022    Patient Name: Emely Rocha : 1945 MRN: [id number]    Restrictions/Precautions:    Medical/Treatment Diagnosis Information:    · Diagnosis: N32.81 - OAB (overactive bladder)    Insurance/Certification information: PT Insurance Information: Medicare    Physician Information: Referring Practitioner: Jagjit Marroquin MD    Plan of care signed (Y/N): Y  Cosigned by: Nelida Alan MD at 2022  7:49 AM       Visit# / total visits: 4/10+    Medicare Cap (if applicable):    $541.54= total amount used, updated 2022    Time in: 1400  Time Out: 1455  Total Treatment Time: 55 minutes    Charges: Therapeutic Activity (17545) x2    Therapeutic Exercise (49855) x1    Manual (13512)    Neuromuscular Re-ed (60835) x1      ________________________________________________________________________________________    Pain Level: denies    SUBJECTIVE:     Patient reports that her spouse is doing well and daughter needed additional surgery. Patient reports that she a pain management appointment in about 6 months - but so far back and neck are doing as well as expected, continues with pain in her arms and shoulders - seeing ortho at the end of April. Patient reports mostly getting up 1-2x/night - realizes that behavioral modifications are helpful. Limiting fluids in the evening to sleep better. On average gets ~7 hours of sleep. Performing stretches about every other day. Patient reports that her bowels have been more regular, having BM almost every day - taking 1/2 dose of Miralax which seems to be helpful. Urinating about every 2 hours during the day, intermittently using quick flicks for urge suppression and was able to hold closer to 3 hours while out shopping. Denies urinary leakage.   Patient saw Dr. Muriel Garnica 3/23 and has been released. Since her bladder control issues are improving without medication, she is planning for this to be her last  PF PT session. OBJECTIVE:    Treatment Interventions Implemented     Exercise/Neuromuscular Re-education - Written HEP instructions provided and reviewed     Diaphragmatic breathing - coordinating with pelvic floor muscle activities - tactile cues on stomach - cues for PF descent with inhalation - EXHALATION WITH EXERTIONAL/LIFTING ACTIVITIES     PF muscle control exercises -  supine - transvaginal feedback - QUICK FLICKS - 10 reps with cues to use for urge suppression and may practice for exercise ~2 times/day - reviewed technique and effective/appropriate use for urge suppression    Patient demonstrated following exercise for her lower back which she added back in since her first PF PT visit and one that she was performing prior to having her recent issues with bladder control - suggested slightly longer hold and fewer reps that previous with focus more on breathing and allowing tension to release during stretches. Realized that when she returned to gym the bench she used for these exercises was removed, so she quit doing these exercises for her lower back. This was all about the time that her bladder issues presented.    Hamstring stretch - seated on edge of mat with one leg extend and trunk flex - hold 1 minute = 10 slow deep breaths x 2-3 reps LE- cues to look straight ahead - unable to hold arms up as reaching forward, so instructed to let arms rest on bed but look straight ahead to maintain more upright trunk - finds lying down and performing 90/90 hamstring stretches work best for her  Single knee to chest - hold 1 minute = 10 slow deep breaths x 2-3 reps each LE  Double knee to chest - hold ~ 2+ minutes while performing exercises with her ankles to end her typical therapy routine   Lateral thigh/piriformis stretch - one leg crossed over the other in supine -   hold for 1 minute = 10 slow, deep breaths s 2-3 reps - slight modifications made and combined with other stretch of lateral thigh with more hip flex  Strengthening -   Side lying - hip abd strengthening against gravity with knee extended- hold 4 sec x 10 reps - each LE   Side lying - hip adb with knee and hip flexed /clam shells - hold 4 sec x 10 reps - each LE    Piriformis stretch - figure four supine and then sitting - hold 8-10 breaths/~1 minute x 2 reps each LE - reports increased tension in L buttocks compared to R - felt better stretch in supine compared to sitting position - prefers lying down to sitting     Reviewed -   Posterior pelvic tilts - hold for 5-10 seconds x 10 reps - tactile and verbal cues to activate TA and to avoid clenching of PF muscles  Core stabilization activities - adding alternating shoulder flex - but unable due to increased shoulder/arm pain, alternating hip flex, the alternating bringing same hand to same knee with hip flex/shoulder ext toward knee x 10 reps while maintaining TA contraction with ppt. - cues to exhale with each exertion/lift  Straight leg raise x 10 reps each LE - cues for core stabilization prior to leg lift and exhalation with exertion/lift  Added if patient would like to progress beyond core stabilization activities in supine to more functional positions -   Core stabilization activities - seated on therapy ball - ant/posterior pelvic tilts with slightly increased back discomfort, lateral, clockwise and counterclockwise x 10 reps. Core stabilization activities - seated on therapy ball - adding alternating shoulder flex, alternating hip flex x 10 reps while maintaining TA contraction with ppt.      Manual Interventions -   Deferred transvaginal PF muscle assessment and interventions due to no specific concerns and wished to focus on ongoing home program.     Last assessed on 3/16/22 -             Patient Education -   Extensive education on anatomy of pelvis including PF muscles and pelvic organs. Emphasized need for stretching and down training of tight muscles and strengthening of weak muscles to promote improve muscle balance in pelvis/low back and legs. Used PF model to demonstrate transvaginal PF muscle assessment to which patient verbally consented. Patient appeared to have better understanding of current bladder/bowel issues and improved outlook on situation by end of PF PT therapy session.  Issued and reviewed handouts on , contributing factors to PF dysfunction, normal bladder health/bladder irritants, diaphragmatic breathing, quick flicks for urge suppression, over active bladder, constipation.   Patient reports successful use of breathing and rocking techniques to improve bladder emptying when she urinated prior to transvaginal PF muscle assessment. Reviewed reasoning for advancing stretches and strengthen activities for core, lower back and PF, specifically for core stabilization prior to any and all activities and exhaling on exertion. Reviewed anatomy and relationship of piriformis muscle to sciatic and perineal nerves and how compression due to tightness in her muscles could be contributing to her current bladder issues. Issued and reviewed booklet on body mechanics, emphasizing need for TA contraction prior to and during lifting activities and exhalation on exertion with all functional activities and exercises. Patient reports using TA contraction in preparation and exhalation during exertion with exercise machines at the gym as well as with functional activities for household chores. Educated on benefits of continued PF PT for direct manual techniques/interventions to tight tissues noted with transvaginal assessment, however patient seems to be satisfied with her current improvements in regards to bladder control.   Patient has moderate to severe burning pain with palpation and treatment in and around the hypersensitive area at 5-6 o'clock in her superficial PF muscles, and does not seem interested in pursuing further transvaginal interventions despite potential benefits. Educated patient that this PF muscle/tissue tightness could be the underlying cause of not only her bladder issues, but also her lower back and other nerve/pain control issues. ASSESSMENT:  Patient has met her personal goal, 3/3 short term goals, and 7/7 long term goals. Shecontinues to report moderate improvements with bladder control after implementing several suggested behavioral modifications and performing stretches and strengthening exercise routine, and is not needed to take any medication for bladder control. Despite continued stress revolving around her daughter's family,  patient has been able to appreciate some decrease in urinary urgency and frequency, generally waking up to urinate no more than twice a night, and as little as once a night about 50% of the time, and being able to hold urine up to 3 hours when out shopping with use of quick flicks for urge suppression. Patient is satisfied with her progress and would like to discontinue PF PT at this time. Discussed need to continue with home program and if symptoms return/flare to be sure she is still continuing or resuming her home program.  Advised that if she has a need to return for additional PF PT in the future, she would need a new prescription, but could be from her PCP. Treatment/Activity Tolerance:    Patient tolerated treatment well   Reports significant improvement in overall muscle flexibility since performing stretches more consistently. Demonstrates understanding of relationship of tight muscles surrounding the pelvis/pelvic floor and increased tension of her bladder with increased sensitivity, however  urinary urgency and frequency have significantly improved and she is no longer experiencingstress urinary incontinence with coughing/sneezing and gag reflex when brushing her teeth. Goals:  GOALS:  Patient stated goal: \"less trips to the bathroom especially during the night (3-4 time)\" - gets up early every day and needs to take a nap in the afternoon since she is not well rested  []? Progressing: [x]? Met: []? Not Met: []? Adjusted        Therapist goals for Patient:      Short Term Goals: To be achieved in: 4-5 sessions     1. Patient will have a decrease in urination frequency and urgency to indicate improvement in pelvic floor strength and relaxation, muscle coordination, and/or bladder retraining.  []? Progressing: [x]? Met: []? Not Met: []? Adjusted   2. Perform HEP for pelvic floor and core muscle groups with minimal direction from therapist as she progresses to become more independent managing her pelvic pressure and PF muscle weakness and/or tightness. []? Progressing: [x]? Met: []? Not Met: []? Adjusted  3. Report using 1-2 behavioral modification strategies to reduce urinary/bowel complaints through dietary and mechanical changes, with focus on full emptying of bladder with each urination and using optimal positioning and deep breathing for relaxation of posterior PF muscles during defacation, reducing need to strain. []? Progressing: [x]? Met: []? Not Met: []? Adjusted     Long Term Goals: To be achieved in: 8-10 sessions     1. Improve score of quality of life index measure, PFDI-20, to 50 or less (initial eval - 71.88) disability index to assist with reaching prior level of function and demonstrating improved quality of life with less life interruptions due to urinary urgency, frequency, and incontinence allowing patient to fully participate in socially appropriate activities, including caring for spouse and exercising. - met - 4/13/22 - PFDI-20 = 0  []? Progressing: [x]? Met: []? Not Met: []? Adjusted  2. Patient will increase PERF score to 3/5 to demonstrate increased strength and control over pelvic floor musculature.  - not reassessed - appears to be functional within the system as patient reports no urinary leakage/incontinence   []? Progressing: [x]? Met: []? Not Met: []? Adjusted  3. Patient will return to functional activities including household chores and moderate level of exercise without increased symptoms or restriction. []? Progressing: [x]? Met: []? Not Met: []? Adjusted  4. Patient will get up to urinate only 2 or less times each night consistently. (up 3-4 times/night at initial eval)   []? Progressing: [x]? Met: []? Not Met: []? Adjusted            5. Report using 3-4 behavioral modification strategies to reduce urinary/bowel complaints through dietary and mechanical changes, with focus on full emptying of bladder with each urination and using optimal positioning and deep breathing for relaxation of posterior PF muscles during defacation, reducing need to strain. []? Progressing: [x]? Met: []? Not Met: []? Adjusted            6. Rate severity of the problem related to urinary  frequency/urgency/incontinence to 3-4 or less on scale of 0-10 with 0 being no problem and 10 being the worst - (8/10 reported at intial eval). - met - 4/13/22 - reports 0/10 - feels that there are no significant restrictions  []? Progressing: [x]? Met: []? Not Met: []? Adjusted            7. Perform HEP for pelvic floor and core muscle groups independently as she progresses to self-manage her pelvic pressure and PF muscle weakness and/or tightness. []? Progressing: [x]? Met: []? Not Met: []? Adjusted                        Plan:   Discontinue PF PT at this time. Electronically signed by Juan Liao, PT #[id number] on 4/13/2022 at 3:05 PM    Note: If patient does not return for scheduled/recommended follow up visits, this note will serve as a discharge from care along with the most recent update on progress.

## 2022-04-13 ENCOUNTER — HOSPITAL ENCOUNTER (OUTPATIENT)
Dept: PHYSICAL THERAPY | Age: 77
Setting detail: THERAPIES SERIES
Discharge: HOME | End: 2022-04-13
Payer: MEDICARE

## 2022-04-13 PROCEDURE — 97112 NEUROMUSCULAR REEDUCATION: CPT | Performed by: PHYSICAL THERAPIST

## 2022-04-13 PROCEDURE — 97530 THERAPEUTIC ACTIVITIES: CPT | Performed by: PHYSICAL THERAPIST

## 2022-04-13 PROCEDURE — 97110 THERAPEUTIC EXERCISES: CPT | Performed by: PHYSICAL THERAPIST

## 2022-07-15 ENCOUNTER — APPOINTMENT (OUTPATIENT)
Dept: GENERAL RADIOLOGY | Age: 77
End: 2022-07-15
Payer: MEDICARE

## 2022-07-15 ENCOUNTER — HOSPITAL ENCOUNTER (OUTPATIENT)
Age: 77
Setting detail: OBSERVATION
Discharge: HOME | End: 2022-07-16
Attending: EMERGENCY MEDICINE
Payer: MEDICARE

## 2022-07-15 DIAGNOSIS — M79.602 LEFT ARM PAIN: ICD-10-CM

## 2022-07-15 DIAGNOSIS — R07.89 ATYPICAL CHEST PAIN: ICD-10-CM

## 2022-07-15 DIAGNOSIS — I49.3 VENTRICULAR QUADRIGEMINY: ICD-10-CM

## 2022-07-15 DIAGNOSIS — R00.2 PALPITATIONS: Primary | ICD-10-CM

## 2022-07-15 PROBLEM — R07.9 CHEST PAIN: Status: ACTIVE | Noted: 2022-07-15

## 2022-07-15 LAB
A/G RATIO: 1.5 (ref 1.1–2.2)
ALBUMIN SERPL-MCNC: 4.1 G/DL (ref 3.4–5)
ALP BLD-CCNC: 87 U/L (ref 40–129)
ALT SERPL-CCNC: 17 U/L (ref 10–40)
ANION GAP SERPL CALCULATED.3IONS-SCNC: 11 MMOL/L (ref 3–16)
AST SERPL-CCNC: 22 U/L (ref 15–37)
BASOPHILS ABSOLUTE: 0 K/UL (ref 0–0.2)
BASOPHILS RELATIVE PERCENT: 0.9 %
BILIRUB SERPL-MCNC: 0.5 MG/DL (ref 0–1)
BUN BLDV-MCNC: 29 MG/DL (ref 7–20)
CALCIUM SERPL-MCNC: 9.5 MG/DL (ref 8.3–10.6)
CHLORIDE BLD-SCNC: 105 MMOL/L (ref 99–110)
CO2: 24 MMOL/L (ref 21–32)
CREAT SERPL-MCNC: 1.1 MG/DL (ref 0.6–1.2)
EKG ATRIAL RATE: 53 BPM
EKG ATRIAL RATE: 66 BPM
EKG DIAGNOSIS: NORMAL
EKG DIAGNOSIS: NORMAL
EKG P AXIS: 41 DEGREES
EKG P AXIS: 60 DEGREES
EKG P-R INTERVAL: 148 MS
EKG P-R INTERVAL: 148 MS
EKG Q-T INTERVAL: 420 MS
EKG Q-T INTERVAL: 472 MS
EKG QRS DURATION: 78 MS
EKG QRS DURATION: 80 MS
EKG QTC CALCULATION (BAZETT): 440 MS
EKG QTC CALCULATION (BAZETT): 442 MS
EKG R AXIS: 15 DEGREES
EKG R AXIS: 30 DEGREES
EKG T AXIS: 26 DEGREES
EKG T AXIS: 44 DEGREES
EKG VENTRICULAR RATE: 53 BPM
EKG VENTRICULAR RATE: 66 BPM
EOSINOPHILS ABSOLUTE: 0.1 K/UL (ref 0–0.6)
EOSINOPHILS RELATIVE PERCENT: 2.3 %
GFR AFRICAN AMERICAN: 58
GFR NON-AFRICAN AMERICAN: 48
GLUCOSE BLD-MCNC: 106 MG/DL (ref 70–99)
HCT VFR BLD CALC: 33.2 % (ref 36–48)
HEMOGLOBIN: 11.5 G/DL (ref 12–16)
LYMPHOCYTES ABSOLUTE: 1.3 K/UL (ref 1–5.1)
LYMPHOCYTES RELATIVE PERCENT: 27.3 %
MAGNESIUM: 2 MG/DL (ref 1.8–2.4)
MCH RBC QN AUTO: 32.8 PG (ref 26–34)
MCHC RBC AUTO-ENTMCNC: 34.5 G/DL (ref 31–36)
MCV RBC AUTO: 94.9 FL (ref 80–100)
MONOCYTES ABSOLUTE: 0.6 K/UL (ref 0–1.3)
MONOCYTES RELATIVE PERCENT: 12.1 %
NEUTROPHILS ABSOLUTE: 2.8 K/UL (ref 1.7–7.7)
NEUTROPHILS RELATIVE PERCENT: 57.4 %
PDW BLD-RTO: 13 % (ref 12.4–15.4)
PLATELET # BLD: 251 K/UL (ref 135–450)
PMV BLD AUTO: 8.7 FL (ref 5–10.5)
POTASSIUM SERPL-SCNC: 3.7 MMOL/L (ref 3.5–5.1)
RBC # BLD: 3.5 M/UL (ref 4–5.2)
SODIUM BLD-SCNC: 140 MMOL/L (ref 136–145)
TOTAL PROTEIN: 6.9 G/DL (ref 6.4–8.2)
TROPONIN: <0.01 NG/ML
WBC # BLD: 4.8 K/UL (ref 4–11)

## 2022-07-15 PROCEDURE — 6360000002 HC RX W HCPCS: Performed by: INTERNAL MEDICINE

## 2022-07-15 PROCEDURE — 93010 ELECTROCARDIOGRAM REPORT: CPT | Performed by: INTERNAL MEDICINE

## 2022-07-15 PROCEDURE — 83735 ASSAY OF MAGNESIUM: CPT

## 2022-07-15 PROCEDURE — 71045 X-RAY EXAM CHEST 1 VIEW: CPT

## 2022-07-15 PROCEDURE — 2580000003 HC RX 258: Performed by: INTERNAL MEDICINE

## 2022-07-15 PROCEDURE — G0378 HOSPITAL OBSERVATION PER HR: HCPCS

## 2022-07-15 PROCEDURE — 80053 COMPREHEN METABOLIC PANEL: CPT

## 2022-07-15 PROCEDURE — 84484 ASSAY OF TROPONIN QUANT: CPT

## 2022-07-15 PROCEDURE — 99205 OFFICE O/P NEW HI 60 MIN: CPT | Performed by: INTERNAL MEDICINE

## 2022-07-15 PROCEDURE — 93005 ELECTROCARDIOGRAM TRACING: CPT | Performed by: EMERGENCY MEDICINE

## 2022-07-15 PROCEDURE — 2580000003 HC RX 258

## 2022-07-15 PROCEDURE — 96361 HYDRATE IV INFUSION ADD-ON: CPT

## 2022-07-15 PROCEDURE — 99285 EMERGENCY DEPT VISIT HI MDM: CPT

## 2022-07-15 PROCEDURE — 96360 HYDRATION IV INFUSION INIT: CPT

## 2022-07-15 PROCEDURE — 36415 COLL VENOUS BLD VENIPUNCTURE: CPT

## 2022-07-15 PROCEDURE — 6370000000 HC RX 637 (ALT 250 FOR IP)

## 2022-07-15 PROCEDURE — 93005 ELECTROCARDIOGRAM TRACING: CPT

## 2022-07-15 PROCEDURE — 6370000000 HC RX 637 (ALT 250 FOR IP): Performed by: INTERNAL MEDICINE

## 2022-07-15 PROCEDURE — 96372 THER/PROPH/DIAG INJ SC/IM: CPT

## 2022-07-15 PROCEDURE — 85025 COMPLETE CBC W/AUTO DIFF WBC: CPT

## 2022-07-15 RX ORDER — ACETAMINOPHEN 650 MG/1
650 SUPPOSITORY RECTAL EVERY 6 HOURS PRN
Status: DISCONTINUED | OUTPATIENT
Start: 2022-07-15 | End: 2022-07-16 | Stop reason: HOSPADM

## 2022-07-15 RX ORDER — ASPIRIN 81 MG/1
81 TABLET, CHEWABLE ORAL DAILY
Status: DISCONTINUED | OUTPATIENT
Start: 2022-07-16 | End: 2022-07-16 | Stop reason: HOSPADM

## 2022-07-15 RX ORDER — ONDANSETRON 2 MG/ML
4 INJECTION INTRAMUSCULAR; INTRAVENOUS EVERY 6 HOURS PRN
Status: DISCONTINUED | OUTPATIENT
Start: 2022-07-15 | End: 2022-07-16 | Stop reason: HOSPADM

## 2022-07-15 RX ORDER — ACETAMINOPHEN 325 MG/1
650 TABLET ORAL EVERY 6 HOURS PRN
Status: DISCONTINUED | OUTPATIENT
Start: 2022-07-15 | End: 2022-07-16 | Stop reason: HOSPADM

## 2022-07-15 RX ORDER — ACETAMINOPHEN 160 MG
2 TABLET,DISINTEGRATING ORAL
COMMUNITY

## 2022-07-15 RX ORDER — ENOXAPARIN SODIUM 100 MG/ML
40 INJECTION SUBCUTANEOUS NIGHTLY
Status: DISCONTINUED | OUTPATIENT
Start: 2022-07-15 | End: 2022-07-16 | Stop reason: HOSPADM

## 2022-07-15 RX ORDER — ONDANSETRON 4 MG/1
4 TABLET, ORALLY DISINTEGRATING ORAL EVERY 8 HOURS PRN
Status: DISCONTINUED | OUTPATIENT
Start: 2022-07-15 | End: 2022-07-16 | Stop reason: HOSPADM

## 2022-07-15 RX ORDER — SODIUM CHLORIDE 0.9 % (FLUSH) 0.9 %
5-40 SYRINGE (ML) INJECTION PRN
Status: DISCONTINUED | OUTPATIENT
Start: 2022-07-15 | End: 2022-07-16 | Stop reason: HOSPADM

## 2022-07-15 RX ORDER — ACETAMINOPHEN 325 MG/1
650 TABLET ORAL ONCE
Status: COMPLETED | OUTPATIENT
Start: 2022-07-15 | End: 2022-07-15

## 2022-07-15 RX ORDER — NITROGLYCERIN 0.4 MG/1
0.4 TABLET SUBLINGUAL EVERY 5 MIN PRN
Status: DISCONTINUED | OUTPATIENT
Start: 2022-07-15 | End: 2022-07-16 | Stop reason: HOSPADM

## 2022-07-15 RX ORDER — LISINOPRIL AND HYDROCHLOROTHIAZIDE 12.5; 1 MG/1; MG/1
1 TABLET ORAL EVERY OTHER DAY
COMMUNITY

## 2022-07-15 RX ORDER — ASPIRIN 81 MG/1
324 TABLET, CHEWABLE ORAL ONCE
Status: COMPLETED | OUTPATIENT
Start: 2022-07-15 | End: 2022-07-15

## 2022-07-15 RX ORDER — ATORVASTATIN CALCIUM 40 MG/1
40 TABLET, FILM COATED ORAL NIGHTLY
Status: DISCONTINUED | OUTPATIENT
Start: 2022-07-15 | End: 2022-07-15

## 2022-07-15 RX ORDER — SODIUM CHLORIDE, SODIUM LACTATE, POTASSIUM CHLORIDE, AND CALCIUM CHLORIDE .6; .31; .03; .02 G/100ML; G/100ML; G/100ML; G/100ML
1000 INJECTION, SOLUTION INTRAVENOUS ONCE
Status: COMPLETED | OUTPATIENT
Start: 2022-07-15 | End: 2022-07-15

## 2022-07-15 RX ORDER — SODIUM CHLORIDE 0.9 % (FLUSH) 0.9 %
5-40 SYRINGE (ML) INJECTION EVERY 12 HOURS SCHEDULED
Status: DISCONTINUED | OUTPATIENT
Start: 2022-07-15 | End: 2022-07-16 | Stop reason: HOSPADM

## 2022-07-15 RX ORDER — LISINOPRIL 10 MG/1
10 TABLET ORAL DAILY
Status: DISCONTINUED | OUTPATIENT
Start: 2022-07-15 | End: 2022-07-16 | Stop reason: HOSPADM

## 2022-07-15 RX ORDER — ASPIRIN 81 MG/1
81 TABLET, CHEWABLE ORAL DAILY
Status: DISCONTINUED | OUTPATIENT
Start: 2022-07-15 | End: 2022-07-15

## 2022-07-15 RX ORDER — SODIUM CHLORIDE 9 MG/ML
INJECTION, SOLUTION INTRAVENOUS PRN
Status: DISCONTINUED | OUTPATIENT
Start: 2022-07-15 | End: 2022-07-16 | Stop reason: HOSPADM

## 2022-07-15 RX ORDER — M-VIT,TX,IRON,MINS/CALC/FOLIC 27MG-0.4MG
1 TABLET ORAL DAILY
COMMUNITY

## 2022-07-15 RX ORDER — ATORVASTATIN CALCIUM 40 MG/1
40 TABLET, FILM COATED ORAL NIGHTLY
Status: DISCONTINUED | OUTPATIENT
Start: 2022-07-15 | End: 2022-07-16 | Stop reason: HOSPADM

## 2022-07-15 RX ORDER — POLYETHYLENE GLYCOL 3350 17 G/17G
17 POWDER, FOR SOLUTION ORAL DAILY PRN
Status: DISCONTINUED | OUTPATIENT
Start: 2022-07-15 | End: 2022-07-16 | Stop reason: HOSPADM

## 2022-07-15 RX ADMIN — SODIUM CHLORIDE, PRESERVATIVE FREE 10 ML: 5 INJECTION INTRAVENOUS at 20:30

## 2022-07-15 RX ADMIN — LISINOPRIL 10 MG: 10 TABLET ORAL at 18:28

## 2022-07-15 RX ADMIN — ACETAMINOPHEN 325MG 650 MG: 325 TABLET ORAL at 18:28

## 2022-07-15 RX ADMIN — SODIUM CHLORIDE, POTASSIUM CHLORIDE, SODIUM LACTATE AND CALCIUM CHLORIDE 1000 ML: 600; 310; 30; 20 INJECTION, SOLUTION INTRAVENOUS at 07:24

## 2022-07-15 RX ADMIN — ATORVASTATIN CALCIUM 40 MG: 40 TABLET, FILM COATED ORAL at 20:30

## 2022-07-15 RX ADMIN — ENOXAPARIN SODIUM 40 MG: 40 INJECTION SUBCUTANEOUS at 20:30

## 2022-07-15 RX ADMIN — ASPIRIN 324 MG: 81 TABLET, CHEWABLE ORAL at 06:52

## 2022-07-15 RX ADMIN — ACETAMINOPHEN 650 MG: 325 TABLET ORAL at 06:53

## 2022-07-15 ASSESSMENT — ENCOUNTER SYMPTOMS
SHORTNESS OF BREATH: 0
SORE THROAT: 0
EYE PAIN: 0
CONSTIPATION: 0
NAUSEA: 0
DIARRHEA: 0
ABDOMINAL PAIN: 0
BACK PAIN: 0
VOMITING: 0
RHINORRHEA: 0
COUGH: 0

## 2022-07-15 ASSESSMENT — PAIN SCALES - GENERAL
PAINLEVEL_OUTOF10: 5
PAINLEVEL_OUTOF10: 5
PAINLEVEL_OUTOF10: 3

## 2022-07-15 ASSESSMENT — PAIN DESCRIPTION - PAIN TYPE: TYPE: ACUTE PAIN

## 2022-07-15 ASSESSMENT — PAIN DESCRIPTION - DESCRIPTORS: DESCRIPTORS: ACHING

## 2022-07-15 ASSESSMENT — HEART SCORE: ECG: 0

## 2022-07-15 ASSESSMENT — PAIN DESCRIPTION - FREQUENCY: FREQUENCY: CONTINUOUS

## 2022-07-15 ASSESSMENT — PAIN DESCRIPTION - ORIENTATION
ORIENTATION: LEFT
ORIENTATION: LEFT

## 2022-07-15 ASSESSMENT — PAIN - FUNCTIONAL ASSESSMENT
PAIN_FUNCTIONAL_ASSESSMENT: PREVENTS OR INTERFERES SOME ACTIVE ACTIVITIES AND ADLS
PAIN_FUNCTIONAL_ASSESSMENT: 0-10

## 2022-07-15 ASSESSMENT — PAIN DESCRIPTION - LOCATION
LOCATION: ARM
LOCATION: ARM

## 2022-07-15 ASSESSMENT — PAIN DESCRIPTION - ONSET: ONSET: PROGRESSIVE

## 2022-07-15 NOTE — ED NOTES
to go home. If pt moved upstairs before he returns call at [phone].      Danay Plascencia RN  07/15/22 [id number]

## 2022-07-15 NOTE — ED PROVIDER NOTES
I have personally performed a face to face diagnostic evaluation on this patient. I have fully participated in the care of this patient I personally saw the patient and performed a substantive portion of the visit including all aspects of the medical decision making. I have reviewed and agree with all pertinent clinical information including history, physical exam, diagnostic tests, and the plan. HPI: Ruddy Goff presented with left arm pain that woke her up out of sleep. No known injury. Not worse with exertion. Constant and unchanging. Aching in nature. Patient does have a history of prior arthritis and potential rotator cuff injury to that arm however states this feels different. Patient also has history of previous breast cancer. No other associated symptoms. See SHAD note for further details of history. Chief Complaint   Patient presents with    Arm Pain     Pt came in with left arm pain pt states at 2am woke her up from sleep. No known injury     Review of Systems: See SHAD note  Vital Signs: BP (!) 176/97   Pulse 68   Temp 98.7 °F (37.1 °C) (Oral)   Resp 17   Ht 5' 6\" (1.676 m)   Wt 173 lb 4.5 oz (78.6 kg)   SpO2 99%   BMI 27.97 kg/m²     Alert 69 y.o. female who does not appear toxic or acutely ill  HENT: Atraumatic, oral mucosa moist  Neck: Grossly normal ROM  Chest/Lungs: respiratory effort normal   Abdomen: Soft nontender  Extremities: 2+ radial bilaterally  Musculoskeletal: Grossly normal ROM, some reproducible element of left shoulder pain  Skin: No palor or diaphoresis    Medical Decision Making and Plan:  Pertinent Labs & Imaging studies reviewed. (See SHAD chart for details)  I agree with SHAD assessment and plan. May be musculoskeletal in origin however patient has no prior history of cardiac work-up. Pain itself is not necessarily always reproducible with movement of the shoulder. Cannot rule out cardiac at this time.   Patient has a heart score of 5 just based on history, age and risk factors. Initial troponin is negative EKG without signs of acute ischemia but some regular PVCs. Patient is hypertensive. Patient was offered admission but is not particularly interested in being admitted at this time as she states she has family obligations that she needs to see to today. We will speak with her and offered admission however if she is not willing to stay given that her heart score is low moderate will give her risks and benefits as well as obtain second troponin and second EKG if no further changes will discharge with strict return precautions for any new or worsening symptoms as well as instructions to follow-up with her primary care provider as soon as possible. Update:  Now willing to be admitted. Patient has heart score 4. We will still repeat troponin and EKG. We will admit for further cardiac work-up.      Rachel Nuñez MD  07/15/22 [id number]

## 2022-07-15 NOTE — PROGRESS NOTES
Pt admitted to floor from ED. Patient is alert & oriented x4, UAL, 2/4 bed rails up, bed in lowest position, fall precautions in place, call light within reach. Will cont to monitor and reassess.  Electronically signed by Lupis Bajwa RN on 7/15/2022 at 5:13 PM

## 2022-07-15 NOTE — H&P
Hospital Medicine History & Physical      PCP: Daryn Rodarte MD    Date of Admission: 7/15/2022    Date of Service: Pt seen/examined on 07/15/22   and Placed in Observation. Chief Complaint:    Chief Complaint   Patient presents with    Arm Pain     Pt came in with left arm pain pt states at 2am woke her up from sleep. No known injury         History Of Present Illness: 67-year-old female with past medical history significant for hypertension, chronic left shoulder pain related to rotator cuff injury presents to the emergency department with complaints of left arm pain. Patient reports onset of left arm pain this morning at 2 AM, getting worse around 4 AM.  She describes pain as sharp, 10 out of 10, denies chest pain chest pressure shortness of breath dizziness palpitation. She feels left arm pain is different from left shoulder pain. Laboratory studies showed normal troponin. EKG with sinus bradycardia, ventricular rate 53. Past Medical History:          Diagnosis Date    Arthritis     Breast cancer ([facility] [address]     High cholesterol     Hypertension     Sciatica        Past Surgical History:          Procedure Laterality Date    BREAST BIOPSY Left 1994 & 2017    BREAST LUMPECTOMY Left 1994 & 2017    BUNIONECTOMY Right 1997    KNEE SURGERY  01/2015       Medications Prior to Admission:      Prior to Admission medications    Medication Sig Start Date End Date Taking? Authorizing Provider   lisinopril-hydroCHLOROthiazide (PRINZIDE;ZESTORETIC) 10-12.5 MG per tablet Take 1 tablet by mouth every other day   Yes Historical Provider, MD   Cholecalciferol (VITAMIN D3) 50 MCG (2000 UT) CAPS Take 2 capsules by mouth   Yes Historical Provider, MD   Multiple Vitamins-Minerals (THERAPEUTIC MULTIVITAMIN-MINERALS) tablet Take 1 tablet by mouth in the morning.    Yes Historical Provider, MD   simvastatin (ZOCOR) 40 MG tablet Take 40 mg by mouth nightly    Historical Provider, MD   aspirin 81 MG tablet Take 81 mg by mouth daily    Historical Provider, MD   Misc Natural Products (GLUCOSAMINE CHONDROITIN TRIPLE PO) Take by mouth    Historical Provider, MD   Omega-3 Fatty Acids (FISH OIL) 1200 MG CAPS Take by mouth    Historical Provider, MD   acetaminophen (TYLENOL) 325 MG tablet Take 650 mg by mouth every 6 hours as needed for Pain    Historical Provider, MD       Allergies:  Celebrex [celecoxib], Gabapentin, and Valium [diazepam]    Social History:      The patient currently lives home    TOBACCO:   reports that she has never smoked. She has never used smokeless tobacco.  ETOH:   reports no history of alcohol use. E-cigarette/Vaping       Questions Responses    E-cigarette/Vaping Use     Start Date     Passive Exposure     Quit Date     Counseling Given     Comments               Family History:       Reviewed and negative in regards to presenting illness/complaint. Problem Relation Age of Onset    Breast Cancer Mother     Cancer Mother         melanoma     Cancer Father         luekemia     Coronary Art Dis Father     Osteoarthritis Father     Parkinsonism Father     Ulcerative Colitis Father        REVIEW OF SYSTEMS COMPLETED:   Pertinent positives as noted in the HPI. All other systems reviewed and negative. PHYSICAL EXAM PERFORMED:    BP (!) 156/94   Pulse 62   Temp 98.7 °F (37.1 °C) (Oral)   Resp 18   Ht 5' 6\" (1.676 m)   Wt 173 lb 4.5 oz (78.6 kg)   SpO2 100%   BMI 27.97 kg/m²     General appearance:  No apparent distress, appears stated age and cooperative. HEENT:  Normal cephalic, atraumatic without obvious deformity. Pupils equal, round, and reactive to light. Extra ocular muscles intact. Conjunctivae/corneas clear. Neck: Supple, with full range of motion. No jugular venous distention. Trachea midline. Respiratory:  Normal respiratory effort. Clear to auscultation, bilaterally without Rales/Wheezes/Rhonchi.   Cardiovascular:  Regular rate and rhythm with normal S1/S2 without murmurs, rubs or gallops. Abdomen: Soft, non-tender, non-distended with normal bowel sounds. Musculoskeletal:  No clubbing, cyanosis or edema bilaterally. Full range of motion without deformity. Skin: Skin color, texture, turgor normal.  No rashes or lesions. Neurologic:  Neurovascularly intact without any focal sensory/motor deficits. Cranial nerves: II-XII intact, grossly non-focal.  Psychiatric:  Alert and oriented, thought content appropriate, normal insight  Capillary Refill: Brisk,3 seconds, normal  Peripheral Pulses: +2 palpable, equal bilaterally       Labs:     Recent Labs     07/15/22  0604   WBC 4.8   HGB 11.5*   HCT 33.2*        Recent Labs     07/15/22  0604      K 3.7      CO2 24   BUN 29*   CREATININE 1.1   CALCIUM 9.5     Recent Labs     07/15/22  0604   AST 22   ALT 17   BILITOT 0.5   ALKPHOS 87     No results for input(s): INR in the last 72 hours. Recent Labs     07/15/22  0604 07/15/22  0924   TROPONINI <0.01 <0.01       Urinalysis:    No results found for: Annel Lazier, BACTERIA, RBCUA, BLOODU, [address], GLUCOSEU    Radiology:       EKG:  I have reviewed the EKG with the following interpretation: Sinus bradycardia, ventricular rate 53    XR CHEST PORTABLE   Final Result   Minimal atelectasis right lung base. No acute infiltrate or pneumothorax. Consults:    IP CONSULT TO CARDIOLOGY    ASSESSMENT:    Active Hospital Problems    Diagnosis Date Noted    Chest pain [R07.9] 07/15/2022     Priority: Medium           Problem list    Left arm pain  Sinus bradycardia  Hypertension    Plan    Admit to observation to rule out ACS  Continue to trend troponins, give aspirin  Consult cardiology for further evaluation of left arm pain concerning for ischemic cardiomyopathy      DVT Prophylaxis: Lovenox  Diet: ADULT DIET; Regular;  No Caffeine  Code Status: No Order    PT/OT Eval Status: Requested    Dispo -return home       Margo Mims MD    Thank you Gonzalo Rowan MD for the opportunity to be involved in this patient's care. If you have any questions or concerns please feel free to contact me at [phone].

## 2022-07-15 NOTE — PROGRESS NOTES
Patient A&O. No complaints at this time. Pt's  at bedside. Pt eating dinner, sitting up in chair. Pt given PRN Tylenol per order for pain in L shoulder. Call light within reach, able to make needs known fall precautions in place. Will continue to monitor.  Electronically signed by Vipul Trevino RN on 7/15/2022 at 6:50 PM

## 2022-07-15 NOTE — PROGRESS NOTES
Medication Reconciliation     List of medications patient is currently taking is complete. Source of information: 1. Conversation with patient at bedside                                      2. EPIC records      Allergies  Celebrex [celecoxib], Gabapentin, and Valium [diazepam]     Notes regarding home medications:   1.  Patient last received home medications 7/14    Christel Mcfarland, Pharmacy Intern  7/15/2022 10:20 AM

## 2022-07-15 NOTE — CONSULTS
[address]  Cardiology Consult Note        CC:      Chest pain             HPI:   This is a 69 y.o. female who came to the hospital for left arm pain, irregular heart beat/palpitations and high blood pressure. The patient has been here for about 12 hours. Her troponins are negative and her EKG is normal.    She has no history of heart disease. She has mild hypertension and takes low-dose medicines for that. She states that she has pain in her shoulders and has had injections to help relieve the pain. At 2 in the morning just last night she started having an aching pain between her left shoulder and her elbow. She felt it was strange and perhaps different from the usual shoulder and arm pain. There was no associated chest pain shortness of breath nausea vomiting or diaphoresis. The patient got concerned and took her blood pressure which was slightly higher in the 150s. She also felt that her heart was beating irregularly. She took a shower which is normal for her at 4 in the morning and then came down and checked her blood pressure and it was higher. It is at that point she got concerned and called her  who in turn called 911. Initial EKGs show sinus rhythm with frequent PVCs and bigeminy. A more formal EKG is normal then at 5:30 in the morning in the ER. The patient is very active she exercises goes to the gym walks a mile a day and has no recollection of having any arm or chest pain in the recent past.  The patient describes the arm pain is getting worse when she uses the arm. Has happened here as well in the ER.       Past Medical History:   Diagnosis Date    Arthritis     Breast cancer ([address]     High cholesterol     Hypertension     Sciatica       Past Surgical History:   Procedure Laterality Date    BREAST BIOPSY Left 1994 & 2017    BREAST LUMPECTOMY Left 1994 & 2017    BUNIONECTOMY Right 1997    KNEE SURGERY  01/2015      Family History   Problem Relation Age of Onset    Breast Cancer Mother     Cancer Mother         melanoma     Cancer Father         luekemia     Coronary Art Dis Father     Osteoarthritis Father     Parkinsonism Father     Ulcerative Colitis Father       Social History     Tobacco Use    Smoking status: Never    Smokeless tobacco: Never   Substance Use Topics    Alcohol use: No    Drug use: Never      Allergies   Allergen Reactions    Celebrex [Celecoxib]      Bleeding      Gabapentin      dizzy    Valium [Diazepam]       lisinopril  10 mg Oral Daily    sodium chloride flush  5-40 mL IntraVENous 2 times per day    [START ON 7/16/2022] aspirin  81 mg Oral Daily    atorvastatin  40 mg Oral Nightly    enoxaparin  40 mg SubCUTAneous Nightly       Review of Systems -   Constitutional: Negative for weight gain/loss; malaise, fever  Respiratory: Negative for Asthma;  cough and hemoptysis  Cardiovascular: Negative for palpitations,dizziness   Gastrointestinal: Negative for abd.pain; constipation/diarrhea;    Genitourinary: Negative for stones; hematuria; frequency hesitancy  Integumentt: Negative for rash or pruritis  Hematologic/lymphatic: Negative for blood dyscrasia; leukemia/lymphoma  Musculoskeletal: Negative for Connective tissue disease  Neurological:  Negative for Seizure   Behavioral/Psych:Negative for Bipolar disorder, Schizophrenia; Dementia  Endocrine: negative for thyroid, parathyroid disease    No intake or output data in the 24 hours ending 07/15/22 6511    Physical Examination:    BP (!) 174/92   Pulse 69   Temp 98 °F (36.7 °C) (Axillary)   Resp 17   Ht 5' 6\" (1.676 m)   Wt 173 lb 4.5 oz (78.6 kg)   SpO2 97%   BMI 27.97 kg/m²    HEENT:  Face: Atraumatic, Conjunctiva: Pink; non icteric,  Mucous Memb:  Moist, No thyromegaly or Lymphadenopathy  Respiratory:  Resp Assessment: normal, Resp Auscultation: clear   Cardiovascular: Auscultation: nl S1 & S2, Palpation:  Nl PMI;  No heaves or thrills, JVP:  normal  Abdomen: Soft, non-tender, Normal bowel sounds,  No organomegaly  Extremities: No Cyanosis or Clubbing; Edema none  Neurological: Oriented to time, place, and person, Non-anxious  Psychiatric: Normal mood and affect  Skin: Warm and dry,  No rash seen      Current Facility-Administered Medications: acetaminophen (TYLENOL) tablet 650 mg, 650 mg, Oral, Q6H PRN  lisinopril (PRINIVIL;ZESTRIL) tablet 10 mg, 10 mg, Oral, Daily  sodium chloride flush 0.9 % injection 5-40 mL, 5-40 mL, IntraVENous, 2 times per day  sodium chloride flush 0.9 % injection 5-40 mL, 5-40 mL, IntraVENous, PRN  0.9 % sodium chloride infusion, , IntraVENous, PRN  ondansetron (ZOFRAN-ODT) disintegrating tablet 4 mg, 4 mg, Oral, Q8H PRN **OR** ondansetron (ZOFRAN) injection 4 mg, 4 mg, IntraVENous, Q6H PRN  acetaminophen (TYLENOL) tablet 650 mg, 650 mg, Oral, Q6H PRN **OR** acetaminophen (TYLENOL) suppository 650 mg, 650 mg, Rectal, Q6H PRN  polyethylene glycol (GLYCOLAX) packet 17 g, 17 g, Oral, Daily PRN  [START ON 7/16/2022] aspirin chewable tablet 81 mg, 81 mg, Oral, Daily  atorvastatin (LIPITOR) tablet 40 mg, 40 mg, Oral, Nightly  enoxaparin (LOVENOX) injection 40 mg, 40 mg, SubCUTAneous, Nightly  nitroGLYCERIN (NITROSTAT) SL tablet 0.4 mg, 0.4 mg, SubLINGual, Q5 Min PRN      Labs:   Recent Labs     07/15/22  0604   WBC 4.8   HGB 11.5*   HCT 33.2*        Recent Labs     07/15/22  0604      K 3.7   CO2 24   BUN 29*   CREATININE 1.1   GLUCOSE 106*     No results for input(s): BNP in the last 72 hours. No results for input(s): PROTIME, INR in the last 72 hours. No results for input(s): APTT in the last 72 hours. Recent Labs     07/15/22  0604 07/15/22  0924 07/15/22  1455   TROPONINI <0.01 <0.01 <0.01     No results found for: HDL, LDLDIRECT, LDLCALC, TRIG  Recent Labs     07/15/22  0604   AST 22   ALT 17   LABALBU 4.1         EKG:   NSR            ASSESSMENT AND PLAN:        Left arm pain  In my best medical judgment this does not sound like angina to me.   There is no chest pain shortness of breath or diaphoresis alongside it. The pain is worse when she uses the arm. She she exercises daily; she walks a mile a day with no recollection of having any chest pain. Risk factors for coronary disease include her mother had heart attack it age of 54 and mild history of smoking. She also has high cholesterol and was recently started on simvastatin. 3 sets of cardiac enzymes are negative  Plan is to do a treadmill exercise stress test.  If she is unable to do that we will switch her to a pharmacological stress test.  Her cardiac exam was unremarkable for any murmurs however we will get an echocardiogram to evaluate LV function and wall motion as well as wall thickness. .    I am not convinced that she needs to be on heparin at this time.           Ana Rizo M.D  7/15/2022

## 2022-07-15 NOTE — ED PROVIDER NOTES
[address] [facility]        Pt Name: Yaima Schroeder  MRN: [id number]  [place] 1945  Date of evaluation: 7/15/2022  Provider: SARA Adam - CNP  PCP: Susana Encarnacion MD  Note Started: 7:16 AM EDT       I have seen and evaluated this patient with my supervising physician Brianna Morton MD.      [address]       Chief Complaint   Patient presents with    Arm Pain     Pt came in with left arm pain pt states at 2am woke her up from sleep. No known injury         HISTORY OF PRESENT ILLNESS   (Location/Symptom, Timing/Onset, Context/Setting, Quality, Duration, Modifying Factors, Severity)  Note limiting factors. Yaima Schroeder is a 69 y.o. female who presents for evaluation of left arm pain. Has a history of rotator cuff \"issues\" to that arm but states this pain is different, it is lower down radiates from her shoulder down to her elbow. No real alleviating factors. Did not take any medication prior to coming in. This pain is associated with palpitations which also started roughly 4 hours prior to arrival.  Patient is also concerned as her blood pressures been elevated. Normally has well-controlled blood pressure with lisinopril/hctz the day. States blood pressures run 140s at her baseline. Blood pressure this morning 170s over 90s. Nursing Notes were all reviewed and agreed with or any disagreements were addressed in the HPI. REVIEW OF SYSTEMS    (2-9 systems for level 4, 10 or more for level 5)     Review of Systems   Constitutional:  Negative for chills, diaphoresis and fever. HENT:  Negative for congestion, rhinorrhea and sore throat. Eyes:  Negative for pain and visual disturbance. Respiratory:  Negative for cough and shortness of breath. Cardiovascular:  Positive for palpitations. Negative for chest pain and leg swelling.    Gastrointestinal:  Negative for abdominal pain, constipation, diarrhea, nausea and vomiting. Genitourinary:  Negative for frequency and hematuria. Musculoskeletal:  Negative for back pain and neck pain. Left arm pain   Skin:  Negative for rash and wound. Neurological:  Negative for dizziness and light-headedness.      PAST MEDICAL HISTORY     Past Medical History:   Diagnosis Date    Arthritis     Breast cancer ([address]     High cholesterol     Hypertension     Sciatica        SURGICAL HISTORY     Past Surgical History:   Procedure Laterality Date    BREAST BIOPSY Left 1994 & 2017    BREAST LUMPECTOMY Left 1994 & 2017    BUNIONECTOMY Right 1997    KNEE SURGERY  01/2015       CURRENTMEDICATIONS       Previous Medications    ACETAMINOPHEN (TYLENOL) 325 MG TABLET    Take 650 mg by mouth every 6 hours as needed for Pain    ASPIRIN 81 MG TABLET    Take 81 mg by mouth daily    CALCIUM CARBONATE 600 MG TABS TABLET    Take 1 tablet by mouth daily    FIBER SELECT GUMMIES PO    Take by mouth    LISINOPRIL (PRINIVIL;ZESTRIL) 10 MG TABLET    Take 10 mg by mouth daily    MISC NATURAL PRODUCTS (GLUCOSAMINE CHONDROITIN TRIPLE PO)    Take by mouth    NAPROXEN SODIUM (ALEVE PO)    Take by mouth as needed    OMEGA-3 FATTY ACIDS (FISH OIL) 1200 MG CAPS    Take by mouth    SIMVASTATIN (ZOCOR) 40 MG TABLET    Take 40 mg by mouth nightly       ALLERGIES     Celebrex [celecoxib], Gabapentin, and Valium [diazepam]    FAMILYHISTORY       Family History   Problem Relation Age of Onset    Breast Cancer Mother     Cancer Mother         melanoma     Cancer Father         luekemia     Coronary Art Dis Father     Osteoarthritis Father     Parkinsonism Father     Ulcerative Colitis Father         SOCIAL HISTORY       Social History     Socioeconomic History    Marital status:    Tobacco Use    Smoking status: Never    Smokeless tobacco: Never   Substance and Sexual Activity    Alcohol use: No    Drug use: Never       SCREENINGS    [place] Coma Scale  Eye Opening: Spontaneous  Best Verbal Response: Oriented  Best Motor Response: Obeys commands  [place] Coma Scale Score: 15 Heart Score for chest pain patients  History: Moderately Suspicious  ECG: Normal  Patient Age: > 65 years  *Risk factors for Atherosclerotic disease: Hypercholesterolemia, Hypertension, Positive family History  Risk Factors: > 3 Risk factors or history of atherosclerotic disease*  Troponin: < 1X normal limit  Heart Score Total: 5      PHYSICAL EXAM    (up to 7 for level 4, 8 or more for level 5)     ED Triage Vitals   BP Temp Temp Source Heart Rate Resp SpO2 Height Weight   07/15/22 0558 07/15/22 0558 07/15/22 0558 07/15/22 0558 07/15/22 0558 07/15/22 0558 07/15/22 0559 07/15/22 0559   (!) 176/97 98.7 °F (37.1 °C) Oral 68 17 99 % 5' 6\" (1.676 m) 173 lb 4.5 oz (78.6 kg)       Physical Exam  Vitals and nursing note reviewed. Constitutional:       Appearance: Normal appearance. She is obese. She is not ill-appearing, toxic-appearing or diaphoretic. HENT:      Head: Normocephalic and atraumatic. Right Ear: External ear normal.      Left Ear: External ear normal.      Nose: Nose normal. No congestion or rhinorrhea. Mouth/Throat:      Mouth: Mucous membranes are moist.      Pharynx: Oropharynx is clear. No oropharyngeal exudate or posterior oropharyngeal erythema. Eyes:      General: No scleral icterus. Right eye: No discharge. Left eye: No discharge. Extraocular Movements: Extraocular movements intact. Conjunctiva/sclera: Conjunctivae normal.      Pupils: Pupils are equal, round, and reactive to light. Cardiovascular:      Rate and Rhythm: Normal rate. Rhythm regularly irregular. Pulses: Normal pulses. Radial pulses are 2+ on the right side and 2+ on the left side. Heart sounds: Normal heart sounds. No murmur heard. No friction rub. No gallop. Pulmonary:      Effort: Pulmonary effort is normal. No respiratory distress.       Breath sounds: Normal breath sounds. No stridor. No wheezing, rhonchi or rales. Abdominal:      General: Abdomen is flat. Bowel sounds are normal. There is no distension. Palpations: Abdomen is soft. Tenderness: There is no abdominal tenderness. There is no right CVA tenderness, left CVA tenderness or guarding. Musculoskeletal:         General: Normal range of motion. Cervical back: Normal range of motion and neck supple. No rigidity or tenderness. Skin:     General: Skin is warm and dry. Capillary Refill: Capillary refill takes less than 2 seconds. Coloration: Skin is not jaundiced or pale. Findings: No rash. Neurological:      General: No focal deficit present. Mental Status: She is alert and oriented to person, place, and time. Mental status is at baseline. Psychiatric:         Mood and Affect: Mood normal.         Behavior: Behavior normal.       DIAGNOSTIC RESULTS   LABS:    Labs Reviewed   CBC WITH AUTO DIFFERENTIAL - Abnormal; Notable for the following components:       Result Value    RBC 3.50 (*)     Hemoglobin 11.5 (*)     Hematocrit 33.2 (*)     All other components within normal limits   COMPREHENSIVE METABOLIC PANEL - Abnormal; Notable for the following components:    Glucose 106 (*)     BUN 29 (*)     GFR Non- 48 (*)     GFR  58 (*)     All other components within normal limits   TROPONIN       When ordered, only abnormal lab results are displayed. All other labs were within normal range or not returned as of this dictation. EKG: When ordered, EKG's are interpreted by the Emergency Department Physician in the absence of a cardiologist.  Please see their note for interpretation of EKG.       RADIOLOGY:   Non-plain film images such as CT, Ultrasound and MRI are read by the radiologist. Plain radiographic images are visualized andpreliminarily interpreted by the  ED Provider with the below findings:        Interpretation perthe Radiologist below, if included for SEP-1 Core Measure due to: Infection is not suspected    73-year-old presenting with palpitations and left arm pain as above. Differential Diagnosis: Acute Coronary Syndrome, Congestive Heart Failure, Thoracic Dissection, Pericarditis, Pericardial Effusion, Pulmonary Embolism, Pneumonia, Pneumothorax, Ischemic Bowel, Bowel Obstruction, PUD, GERD, Acute Cholecystitis, Pancreatitis, Hepatitis, Colitis, other    History: Moderately Suspicious  ECG: Normal  Patient Age: > 65 years  *Risk factors for Atherosclerotic disease: Hypercholesterolemia; Hypertension; Positive family History  Risk Factors: > 3 Risk factors or history of atherosclerotic disease*  Troponin: < 1X normal limit  Heart Score Total: 5    Patient's heart score is 5. Labs: CBC without leukocytosis. Chronic anemia. CMP consistent with patient's prior labs from 5/2021. Troponin less than 0.01. [place] and EKG ordered but are pending at this time. Based on the heart score medicine service consulted for admission. Patient was given 324 aspirin 1 L IV fluids in ED plus Tylenol for pain. Reports feeling better     Discussed goals of care, patient would like to be full code       FINAL IMPRESSION      1. Palpitations    2. Ventricular quadrigeminy    3. Left arm pain          DISPOSITION/PLAN   DISPOSITION Decision To Admit 07/15/2022 07:13:12 AM      PATIENT REFERREDTO:  No follow-up provider specified.     DISCHARGE MEDICATIONS:  New Prescriptions    No medications on file       DISCONTINUED MEDICATIONS:  Discontinued Medications    No medications on file              (Please note that portions ofthis note were completed with a voice recognition program.  Efforts were made to edit the dictations but occasionally words are mis-transcribed.)    SARA Morris CNP (electronically signed)              SARA Morris CNP  07/15/22 0088

## 2022-07-15 NOTE — PLAN OF CARE
Problem: Discharge Planning  Goal: Discharge to home or other facility with appropriate resources  Outcome: Progressing  Flowsheets  Taken 7/15/2022 1748  Discharge to home or other facility with appropriate resources: Identify barriers to discharge with patient and caregiver  Taken 7/15/2022 1743  Discharge to home or other facility with appropriate resources: Identify barriers to discharge with patient and caregiver  Taken 7/15/2022 1730  Discharge to home or other facility with appropriate resources: Identify barriers to discharge with patient and caregiver     Problem: Pain  Goal: Verbalizes/displays adequate comfort level or baseline comfort level  Outcome: Progressing  Flowsheets (Taken 7/15/2022 1748)  Verbalizes/displays adequate comfort level or baseline comfort level:   Encourage patient to monitor pain and request assistance   Assess pain using appropriate pain scale   Administer analgesics based on type and severity of pain and evaluate response

## 2022-07-16 VITALS
BODY MASS INDEX: 26.89 KG/M2 | SYSTOLIC BLOOD PRESSURE: 148 MMHG | TEMPERATURE: 98.4 F | HEIGHT: 66 IN | DIASTOLIC BLOOD PRESSURE: 91 MMHG | HEART RATE: 59 BPM | RESPIRATION RATE: 17 BRPM | WEIGHT: 167.33 LBS | OXYGEN SATURATION: 100 %

## 2022-07-16 PROBLEM — I20.89 ANGINA AT REST (HCC): Status: ACTIVE | Noted: 2022-07-15

## 2022-07-16 PROBLEM — I20.8 ANGINA AT REST (HCC): Status: ACTIVE | Noted: 2022-07-15

## 2022-07-16 LAB
EKG ATRIAL RATE: 64 BPM
EKG DIAGNOSIS: NORMAL
EKG P AXIS: 68 DEGREES
EKG P-R INTERVAL: 150 MS
EKG Q-T INTERVAL: 468 MS
EKG QRS DURATION: 84 MS
EKG QTC CALCULATION (BAZETT): 482 MS
EKG R AXIS: 26 DEGREES
EKG T AXIS: 51 DEGREES
EKG VENTRICULAR RATE: 64 BPM
HCT VFR BLD CALC: 32.1 % (ref 36–48)
HEMOGLOBIN: 11 G/DL (ref 12–16)
LV EF: 80 %
LVEF MODALITY: NORMAL
MCH RBC QN AUTO: 32.4 PG (ref 26–34)
MCHC RBC AUTO-ENTMCNC: 34.2 G/DL (ref 31–36)
MCV RBC AUTO: 94.7 FL (ref 80–100)
PDW BLD-RTO: 13.2 % (ref 12.4–15.4)
PLATELET # BLD: 223 K/UL (ref 135–450)
PMV BLD AUTO: 9 FL (ref 5–10.5)
RBC # BLD: 3.39 M/UL (ref 4–5.2)
WBC # BLD: 5.4 K/UL (ref 4–11)

## 2022-07-16 PROCEDURE — 93010 ELECTROCARDIOGRAM REPORT: CPT | Performed by: INTERNAL MEDICINE

## 2022-07-16 PROCEDURE — 85027 COMPLETE CBC AUTOMATED: CPT

## 2022-07-16 PROCEDURE — 93005 ELECTROCARDIOGRAM TRACING: CPT | Performed by: INTERNAL MEDICINE

## 2022-07-16 PROCEDURE — 93017 CV STRESS TEST TRACING ONLY: CPT

## 2022-07-16 PROCEDURE — 9990000010 HC NO CHARGE VISIT

## 2022-07-16 PROCEDURE — 94760 N-INVAS EAR/PLS OXIMETRY 1: CPT

## 2022-07-16 PROCEDURE — G0378 HOSPITAL OBSERVATION PER HR: HCPCS

## 2022-07-16 PROCEDURE — 99226 PR SBSQ OBSERVATION CARE/DAY 35 MINUTES: CPT | Performed by: INTERNAL MEDICINE

## 2022-07-16 PROCEDURE — 2580000003 HC RX 258: Performed by: INTERNAL MEDICINE

## 2022-07-16 PROCEDURE — 78452 HT MUSCLE IMAGE SPECT MULT: CPT

## 2022-07-16 PROCEDURE — 36415 COLL VENOUS BLD VENIPUNCTURE: CPT

## 2022-07-16 PROCEDURE — A9502 TC99M TETROFOSMIN: HCPCS | Performed by: INTERNAL MEDICINE

## 2022-07-16 PROCEDURE — 3430000000 HC RX DIAGNOSTIC RADIOPHARMACEUTICAL: Performed by: INTERNAL MEDICINE

## 2022-07-16 PROCEDURE — 6370000000 HC RX 637 (ALT 250 FOR IP): Performed by: INTERNAL MEDICINE

## 2022-07-16 RX ADMIN — ACETAMINOPHEN 325MG 650 MG: 325 TABLET ORAL at 13:08

## 2022-07-16 RX ADMIN — SODIUM CHLORIDE, PRESERVATIVE FREE 10 ML: 5 INJECTION INTRAVENOUS at 09:40

## 2022-07-16 RX ADMIN — LISINOPRIL 10 MG: 10 TABLET ORAL at 09:40

## 2022-07-16 RX ADMIN — TETROFOSMIN 10 MILLICURIE: 1.38 INJECTION, POWDER, LYOPHILIZED, FOR SOLUTION INTRAVENOUS at 07:00

## 2022-07-16 RX ADMIN — ASPIRIN 81 MG: 81 TABLET, CHEWABLE ORAL at 09:40

## 2022-07-16 RX ADMIN — TETROFOSMIN 30 MILLICURIE: 1.38 INJECTION, POWDER, LYOPHILIZED, FOR SOLUTION INTRAVENOUS at 08:19

## 2022-07-16 ASSESSMENT — PAIN DESCRIPTION - ORIENTATION: ORIENTATION: LEFT

## 2022-07-16 ASSESSMENT — PAIN DESCRIPTION - FREQUENCY: FREQUENCY: CONTINUOUS

## 2022-07-16 ASSESSMENT — PAIN SCALES - GENERAL
PAINLEVEL_OUTOF10: 0
PAINLEVEL_OUTOF10: 0
PAINLEVEL_OUTOF10: 3

## 2022-07-16 ASSESSMENT — PAIN DESCRIPTION - PAIN TYPE: TYPE: ACUTE PAIN

## 2022-07-16 ASSESSMENT — PAIN - FUNCTIONAL ASSESSMENT: PAIN_FUNCTIONAL_ASSESSMENT: PREVENTS OR INTERFERES SOME ACTIVE ACTIVITIES AND ADLS

## 2022-07-16 ASSESSMENT — PAIN DESCRIPTION - ONSET: ONSET: ON-GOING

## 2022-07-16 ASSESSMENT — PAIN DESCRIPTION - DESCRIPTORS: DESCRIPTORS: ACHING

## 2022-07-16 ASSESSMENT — PAIN DESCRIPTION - LOCATION: LOCATION: ARM

## 2022-07-16 NOTE — PLAN OF CARE
Problem: Discharge Planning  Goal: Discharge to home or other facility with appropriate resources  Outcome: Progressing  Note: Assessed patients knowledge of discharge. Will continue to work with patient on discharge planning and answer patient questions. Will consult case management and  as necessary. Electronically signed by Nancy Toth RN on 7/16/2022 at 10:34 AM      Problem: Pain  Goal: Verbalizes/displays adequate comfort level or baseline comfort level  7/16/2022 1034 by Nancy Toth RN  Outcome: Progressing  Note: Educated patient on pain management. Will assess patients pain level per unit protocol, and provide pain management measures as needed. Electronically signed by Nancy Toth RN on 7/16/2022 at 10:34 AM      Problem: Safety - Adult  Goal: Free from fall injury  Outcome: Progressing  Note: Bed in lowest position, wheels locked, bed/chair exit alarm in place, call light within reach, and non skid footwear on. Walkway free of clutter. Pt alert and oriented and able to make needs known. Pt educated to use call light when needing to get up, and pt utilizes call light to make needs known. Will continue to monitor. Electronically signed by Nancy Toth RN on 7/16/2022 at 10:35 AM      Problem: ABCDS Injury Assessment  Goal: Absence of physical injury  Outcome: Progressing  Note: Fall risk assessment completed. Fall precautions in place. Call light within reach. Pt educated on calling for assistance before getting up. Walkway free of clutter. Will continue to monitor.   Electronically signed by Nancy Toth RN on 7/16/2022 at 10:35 AM

## 2022-07-16 NOTE — DISCHARGE SUMMARY
Hospital Medicine Discharge Summary    Patient ID: Diana Zacarias      Patient's PCP: Pau Carrasco MD    Admit Date: 7/15/2022     Discharge Date:   07/16/22      Admitting Provider: No admitting provider for patient encounter. Discharge Provider: Elda Wilson MD     Discharge Diagnoses: Active Hospital Problems    Diagnosis     Angina at rest [facility]) [I20.8]      Priority: Medium       The patient was seen and examined on day of discharge and this discharge summary is in conjunction with any daily progress note from day of discharge. Hospital Course: 60-year-old female past medical history significant for hypertension, admitted with complaints of chest pain. Her initial EKG showed sinus rhythm. Troponins remain negative. Cardiology was consulted for evaluation. Stress test performed this morning was negative for ischemia. Cardiology has cleared for discharge. Physical Exam Performed:     BP (!) 148/91   Pulse 59   Temp 98.4 °F (36.9 °C) (Oral)   Resp 17   Ht 5' 6\" (1.676 m)   Wt 167 lb 5.3 oz (75.9 kg)   SpO2 100%   BMI 27.01 kg/m²       General appearance:  No apparent distress, appears stated age and cooperative. HEENT:  Normal cephalic, atraumatic without obvious deformity. Pupils equal, round, and reactive to light. Extra ocular muscles intact. Conjunctivae/corneas clear. Neck: Supple, with full range of motion. No jugular venous distention. Trachea midline. Respiratory:  Normal respiratory effort. Clear to auscultation, bilaterally without Rales/Wheezes/Rhonchi. Cardiovascular:  Regular rate and rhythm with normal S1/S2 without murmurs, rubs or gallops. Abdomen: Soft, non-tender, non-distended with normal bowel sounds. Musculoskeletal:  No clubbing, cyanosis or edema bilaterally. Full range of motion without deformity. Skin: Skin color, texture, turgor normal.  No rashes or lesions.   Neurologic:  Neurovascularly intact without any focal sensory/motor deficits. Cranial nerves: II-XII intact, grossly non-focal.  Psychiatric:  Alert and oriented, thought content appropriate, normal insight  Capillary Refill: Brisk,< 3 seconds   Peripheral Pulses: +2 palpable, equal bilaterally       Labs: For convenience and continuity at follow-up the following most recent labs are provided:      CBC:    Lab Results   Component Value Date/Time    WBC 5.4 07/16/2022 05:27 AM    HGB 11.0 07/16/2022 05:27 AM    HCT 32.1 07/16/2022 05:27 AM     07/16/2022 05:27 AM       Renal:    Lab Results   Component Value Date/Time     07/15/2022 06:04 AM    K 3.7 07/15/2022 06:04 AM    K 4.1 05/10/2021 05:46 PM     07/15/2022 06:04 AM    CO2 24 07/15/2022 06:04 AM    BUN 29 07/15/2022 06:04 AM    CREATININE 1.1 07/15/2022 06:04 AM    CALCIUM 9.5 07/15/2022 06:04 AM         Significant Diagnostic Studies    Radiology:   NM Cardiac Stress Test Nuclear Imaging   Final Result      XR CHEST PORTABLE   Final Result   Minimal atelectasis right lung base. No acute infiltrate or pneumothorax. Consults:     IP CONSULT TO CARDIOLOGY  IP CONSULT TO HOME CARE NEEDS    Disposition:  home     Condition at Discharge: Stable    Discharge Instructions/Follow-up:  Follow-up with PCP within 7 days from discharge, not doing so could have life-threatening consequences. Take medication as instructed;  return to the emergency department if persistent symptoms, experiencing side effects from medication including nausea vomiting or unable to keep medications down.    Follow-up with cardiology    Code Status:  Full Code     Activity: activity as tolerated    Diet: cardiac diet      Discharge Medications:     Current Discharge Medication List             Details   lisinopril-hydroCHLOROthiazide (PRINZIDE;ZESTORETIC) 10-12.5 MG per tablet Take 1 tablet by mouth every other day      Cholecalciferol (VITAMIN D3) 50 MCG (2000 UT) CAPS Take 2 capsules by mouth      Multiple Vitamins-Minerals (THERAPEUTIC MULTIVITAMIN-MINERALS) tablet Take 1 tablet by mouth in the morning. simvastatin (ZOCOR) 40 MG tablet Take 40 mg by mouth nightly      aspirin 81 MG tablet Take 81 mg by mouth daily      Misc Natural Products (GLUCOSAMINE CHONDROITIN TRIPLE PO) Take by mouth      Omega-3 Fatty Acids (FISH OIL) 1200 MG CAPS Take by mouth      acetaminophen (TYLENOL) 325 MG tablet Take 650 mg by mouth every 6 hours as needed for Pain             Time Spent on discharge is more than 30 minutes in the examination, evaluation, counseling and review of medications and discharge plan. Signed:    Dejah Atkinson MD   7/16/2022      Thank you Porter Turk MD for the opportunity to be involved in this patient's care. If you have any questions or concerns, please feel free to contact me at [phone].

## 2022-07-16 NOTE — PROGRESS NOTES
Patient up in chair. Alert and oriented. NPO until stress test results are back. Morning medications given without difficulty. Patient ambulates in room. VSS. Will continue to monitor.      Electronically signed by Domi Mroeira RN on 7/16/2022 at 10:36 AM

## 2022-07-16 NOTE — PROGRESS NOTES
Physical Therapy    Debra Kelly  7/16/2022  Patient gone from room to Nuclear Medicine. Nursing indicates she is up ad gris in room last evening and this morning. Do not anticipate any acute PT needs here but will check in later with patient and nursing. Electronically signed by Ani José PT on 7/16/2022 at 7:52 AM    12:14pm  Nursing informs patient is up ad gris and without needs for PT OT.   Electronically signed by Ani José PT on 7/16/2022 at 12:15 PM

## 2022-07-16 NOTE — PLAN OF CARE
Problem: Pain  Goal: Verbalizes/displays adequate comfort level or baseline comfort level  7/16/2022 0053 by Erin Griggs  Outcome: Progressing  7/15/2022 1748 by Kelsie Ochoa RN  Outcome: Progressing  Flowsheets (Taken 7/15/2022 1748)  Verbalizes/displays adequate comfort level or baseline comfort level:   Encourage patient to monitor pain and request assistance   Assess pain using appropriate pain scale   Administer analgesics based on type and severity of pain and evaluate response

## 2022-07-16 NOTE — PROGRESS NOTES
[facility]  Cardiology progress note        CC:      Chest pain             HPI:   This is a 69 y.o. female who came to the hospital for left arm pain, irregular heart beat/palpitations and high blood pressure. The patient has been here for about 12 hours. Her troponins are negative and her EKG is normal.    She has no history of heart disease. She has mild hypertension and takes low-dose medicines for that. She states that she has pain in her shoulders and has had injections to help relieve the pain. At 2 in the morning just last night she started having an aching pain between her left shoulder and her elbow. She felt it was strange and perhaps different from the usual shoulder and arm pain. There was no associated chest pain shortness of breath nausea vomiting or diaphoresis. The patient got concerned and took her blood pressure which was slightly higher in the 150s. She also felt that her heart was beating irregularly. She took a shower which is normal for her at 4 in the morning and then came down and checked her blood pressure and it was higher. It is at that point she got concerned and called her  who in turn called 911. Initial EKGs show sinus rhythm with frequent PVCs and bigeminy. A more formal EKG is normal then at 5:30 in the morning in the ER. The patient is very active she exercises goes to the gym walks a mile a day and has no recollection of having any arm or chest pain in the recent past.  The patient describes the arm pain is getting worse when she uses the arm. Has happened here as well in the ER.       Interval history  No further arm discomfort      Past Medical History:   Diagnosis Date    Arthritis     Breast cancer ([address]     High cholesterol     Hypertension     Sciatica       Past Surgical History:   Procedure Laterality Date    BREAST BIOPSY Left 1994 & 2017    BREAST LUMPECTOMY Left 1994 & 2017    BUNIONECTOMY Right 1997    KNEE SURGERY  01/2015      Family History   Problem Relation Age of Onset    Breast Cancer Mother     Cancer Mother         melanoma     Cancer Father         luekemia     Coronary Art Dis Father     Osteoarthritis Father     Parkinsonism Father     Ulcerative Colitis Father       Social History     Tobacco Use    Smoking status: Never    Smokeless tobacco: Never   Substance Use Topics    Alcohol use: No    Drug use: Never      Allergies   Allergen Reactions    Celebrex [Celecoxib]      Bleeding      Gabapentin      dizzy    Valium [Diazepam]       lisinopril  10 mg Oral Daily    sodium chloride flush  5-40 mL IntraVENous 2 times per day    aspirin  81 mg Oral Daily    atorvastatin  40 mg Oral Nightly    enoxaparin  40 mg SubCUTAneous Nightly       Review of Systems -   Constitutional: Negative for weight gain/loss; malaise, fever  Respiratory: Negative for Asthma;  cough and hemoptysis  Cardiovascular: Negative for palpitations,dizziness   Gastrointestinal: Negative for abd.pain; constipation/diarrhea;    Genitourinary: Negative for stones; hematuria; frequency hesitancy  Integumentt: Negative for rash or pruritis  Hematologic/lymphatic: Negative for blood dyscrasia; leukemia/lymphoma  Musculoskeletal: Negative for Connective tissue disease  Neurological:  Negative for Seizure   Behavioral/Psych:Negative for Bipolar disorder, Schizophrenia; Dementia  Endocrine: negative for thyroid, parathyroid disease      Intake/Output Summary (Last 24 hours) at 7/16/2022 1238  Last data filed at 7/16/2022 0756  Gross per 24 hour   Intake 240 ml   Output --   Net 240 ml       Physical Examination:    BP (!) 148/91   Pulse 59   Temp 98.4 °F (36.9 °C) (Oral)   Resp 17   Ht 5' 6\" (1.676 m)   Wt 167 lb 5.3 oz (75.9 kg)   SpO2 100%   BMI 27.01 kg/m²    HEENT:  Face: Atraumatic, Conjunctiva: Pink; non icteric,  Mucous Memb:  Moist, No thyromegaly or Lymphadenopathy  Respiratory:  Resp Assessment: normal, Resp Auscultation: clear   Cardiovascular:   Auscultation: nl S1 & S2, Palpation:  Nl PMI; No heaves or thrills, JVP:  normal  Abdomen: Soft, non-tender, Normal bowel sounds,  No organomegaly  Extremities: No Cyanosis or Clubbing; Edema none  Neurological: Oriented to time, place, and person, Non-anxious  Psychiatric: Normal mood and affect  Skin: Warm and dry,  No rash seen      Current Facility-Administered Medications: acetaminophen (TYLENOL) tablet 650 mg, 650 mg, Oral, Q6H PRN  lisinopril (PRINIVIL;ZESTRIL) tablet 10 mg, 10 mg, Oral, Daily  sodium chloride flush 0.9 % injection 5-40 mL, 5-40 mL, IntraVENous, 2 times per day  sodium chloride flush 0.9 % injection 5-40 mL, 5-40 mL, IntraVENous, PRN  0.9 % sodium chloride infusion, , IntraVENous, PRN  ondansetron (ZOFRAN-ODT) disintegrating tablet 4 mg, 4 mg, Oral, Q8H PRN **OR** ondansetron (ZOFRAN) injection 4 mg, 4 mg, IntraVENous, Q6H PRN  acetaminophen (TYLENOL) tablet 650 mg, 650 mg, Oral, Q6H PRN **OR** acetaminophen (TYLENOL) suppository 650 mg, 650 mg, Rectal, Q6H PRN  polyethylene glycol (GLYCOLAX) packet 17 g, 17 g, Oral, Daily PRN  aspirin chewable tablet 81 mg, 81 mg, Oral, Daily  atorvastatin (LIPITOR) tablet 40 mg, 40 mg, Oral, Nightly  enoxaparin (LOVENOX) injection 40 mg, 40 mg, SubCUTAneous, Nightly  nitroGLYCERIN (NITROSTAT) SL tablet 0.4 mg, 0.4 mg, SubLINGual, Q5 Min PRN      Labs:   Recent Labs     07/15/22  0604 07/16/22  0527   WBC 4.8 5.4   HGB 11.5* 11.0*   HCT 33.2* 32.1*    223     Recent Labs     07/15/22  0604      K 3.7   CO2 24   BUN 29*   CREATININE 1.1   GLUCOSE 106*     No results for input(s): BNP in the last 72 hours. No results for input(s): PROTIME, INR in the last 72 hours. No results for input(s): APTT in the last 72 hours.   Recent Labs     07/15/22  0924 07/15/22  1455 07/15/22  1759   TROPONINI <0.01 <0.01 <0.01     No results found for: HDL, LDLDIRECT, LDLCALC, TRIG  Recent Labs     07/15/22  0604   AST 22   ALT 17   LABALBU 4.1         EKG:   NSR    Stress nuclear scan  Negative treadmill exercise stress portion of the study  Myocardial perfusion scan was also normal        ASSESSMENT AND PLAN:        Left arm pain  Has ruled out for MI  No further arm pain or chest pain    Stress test performed this morning is negative for ischemia  Both the treadmill part of the test as well as the nuclear scan are normal    Discharge home  No need for echocardiogram for now      Hyperlipidemia  Recently started on simvastatin  Last LDL was 95  Previously it was higher than that  However I would recommend using Crestor 20 mg instead of simvastatin      Lani Ku M.D  7/16/2022

## 2022-07-16 NOTE — DISCHARGE INSTR - COC
Continuity of Care Form    Patient Name: Gama Mcfarland   :  1945  MRN:  [id number]    Admit date:  7/15/2022  Discharge date:  ***    Code Status Order: Full Code   Advance Directives:     Admitting Physician:  No admitting provider for patient encounter.   PCP: Ronalee Curling, MD    Discharging Nurse: [facility] Unit/Room#: Q6B-4613/3121-01  Discharging Unit Phone Number: ***    Emergency Contact:   Extended Emergency Contact Information  Primary Emergency Contact: Ramos Us  Address: [address] [facility], [address] [place] [address]  Home Phone: [phone]  Work Phone: [phone]  Relation: Spouse    Past Surgical History:  Past Surgical History:   Procedure Laterality Date    BREAST BIOPSY Left  &     BREAST LUMPECTOMY Left  &     BUNIONECTOMY Right     KNEE SURGERY  2015       Immunization History:   Immunization History   Administered Date(s) Administered    COVID-19, PFIZER PURPLE top, DILUTE for use, (age 15 y+), 30mcg/0.3mL 2021, 2021, 2022       Active Problems:  Patient Active Problem List   Diagnosis Code    Breast cancer of lower-inner quadrant of left female breast ([facility] [address] C50.312    Encounter for antineoplastic radiation therapy Z51.0    Arthritis of knee, right M17.11    Contact dermatitis and other eczema, due to unspecified cause L25.9    Displacement of lumbar intervertebral disc without myelopathy M51.26    Dysphonia of essential tremor R25.1, R49.0    Elevated triglycerides with high cholesterol E78.2    Hypertension I10    Hypertrophic and atrophic condition of skin L91.9, L90.9    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    Knee joint replacement by other means Z96.659    Lumbosacral spondylosis without myelopathy M47.817    Primary localized osteoarthrosis, lower leg M17.10    S/P knee replacement Z96.659    Sacroiliac joint dysfunction of right side M53.3    Dysphonia R49.0    Spondylolisthesis M43.10 Spondylosis of lumbar region without myelopathy or radiculopathy M47.816    Malignant neoplasm of lower-inner quadrant of female breast ([facility] [address] C50.319    Angina at rest [facility]) I20.8       Isolation/Infection:   Isolation            No Isolation          Patient Infection Status       None to display            Nurse Assessment:  Last Vital Signs: BP (!) 148/91   Pulse 59   Temp 98.4 °F (36.9 °C) (Oral)   Resp 17   Ht 5' 6\" (1.676 m)   Wt 167 lb 5.3 oz (75.9 kg)   SpO2 100%   BMI 27.01 kg/m²     Last documented pain score (0-10 scale): Pain Level: 0  Last Weight:   Wt Readings from Last 1 Encounters:   22 167 lb 5.3 oz (75.9 kg)     Mental Status:  {IP PT MENTAL STATUS:}    IV Access:  { JENNIFER IV ACCESS:519715976}    Nursing Mobility/ADLs:  Walking   {[facility] DME GZVD:102208781}  Transfer  {[facility] DME WVMB:984510079}  Bathing  {P DME AUEI:285786266}  Dressing  {P DME NPMU:822039228}  Toileting  {[facility] DME TVZ}  Feeding  {[facility] DME MOTE:537770219}  Med Admin  {[facility] DME HWSA:675363331}  Med Delivery   { JENNIFER MED Delivery:915223209}    Wound Care Documentation and Therapy:        Elimination:  Continence: Bowel: {YES / ET:85535}  Bladder: {YES / JL:73379}  Urinary Catheter: {Urinary Catheter:550307755}   Colostomy/Ileostomy/Ileal Conduit: {YES / HQ:00559}       Date of Last BM: ***    Intake/Output Summary (Last 24 hours) at 2022 1454  Last data filed at 2022 1402  Gross per 24 hour   Intake 720 ml   Output --   Net 720 ml     I/O last 3 completed shifts:   In: 240 [P.O.:240]  Out: -     Safety Concerns:     [address] Safety Concerns:924637497}    Impairments/Disabilities:      [address] Impairments/Disabilities:377444962}    Nutrition Therapy:  Current Nutrition Therapy:   [address] Diet List:165465041}    Routes of Feeding: {CHP DME Other Feedings:059743193}  Liquids: {Slp liquid thickness:77061}  Daily Fluid Restriction: {CHP DME Yes amt example:276894392}  Last Modified Barium Swallow with Video (Video Swallowing Test): {Done Not Done BIJE:581488771}    Treatments at the Time of Hospital Discharge:   Respiratory Treatments: ***  Oxygen Therapy:  {Therapy; copd oxygen:86859}  Ventilator:    { CC Vent GQRZ:023224277}    Rehab Therapies: {THERAPEUTIC INTERVENTION:1732112983}  Weight Bearing Status/Restrictions: { CC Weight Bearin}  Other Medical Equipment (for information only, NOT a DME order):  {EQUIPMENT:559116481}  Other Treatments: ***    Patient's personal belongings (please select all that are sent with patient):  {CHP DME Belongings:467748367}    RN SIGNATURE:  {Esignature:032008953}    CASE MANAGEMENT/SOCIAL WORK SECTION    Inpatient Status Date: ***    Readmission Risk Assessment Score:  Readmission Risk              Risk of Unplanned Readmission:  0           Discharging to Facility/ Agency   Name:   Address:  Phone:  Fax:    Dialysis Facility (if applicable)   Name:  Address:  Dialysis Schedule:  Phone:  Fax:    / signature: {Esignature:947772396}    PHYSICIAN SECTION    Prognosis: Fair    Condition at Discharge: Stable    Rehab Potential (if transferring to Rehab): Fair    Recommended Labs or Other Treatments After Discharge: Follow-up with PCP within 7 days from discharge, not doing so could have life-threatening consequences. Take medication as instructed;  return to the emergency department if persistent symptoms, experiencing side effects from medication including nausea vomiting or unable to keep medications down. Follow up with cardiology recommended     Physician Certification: I certify the above information and transfer of Enma Quiros  is necessary for the continuing treatment of the diagnosis listed and that she requires [address] for greater 30 days.      Update Admission H&P: No change in H&P    PHYSICIAN SIGNATURE:  Electronically signed by Prabhu Shankar MD on 22 at 2:55 PM EDT

## 2022-07-16 NOTE — PROGRESS NOTES
Occupational Therapy  Status Note    Gilson Dear  7/16/2022  [id number]    OT orders noted. Pt up ad grsi in room, completing mobility and self-care tasks without concern. Will sign off at this time - please reorder if concerns arise.      Electronically signed by Claribel Paula OTR/L#[id number] on 7/16/2022 at 10:39 AM

## 2022-08-18 ENCOUNTER — OFFICE VISIT (OUTPATIENT)
Dept: CARDIOLOGY CLINIC | Age: 77
End: 2022-08-18
Payer: MEDICARE

## 2022-08-18 VITALS
HEIGHT: 66 IN | OXYGEN SATURATION: 96 % | HEART RATE: 63 BPM | DIASTOLIC BLOOD PRESSURE: 78 MMHG | BODY MASS INDEX: 27.38 KG/M2 | SYSTOLIC BLOOD PRESSURE: 132 MMHG | WEIGHT: 170.4 LBS

## 2022-08-18 DIAGNOSIS — I20.8 ANGINA AT REST (HCC): Primary | ICD-10-CM

## 2022-08-18 PROCEDURE — 1090F PRES/ABSN URINE INCON ASSESS: CPT | Performed by: INTERNAL MEDICINE

## 2022-08-18 PROCEDURE — 1123F ACP DISCUSS/DSCN MKR DOCD: CPT | Performed by: INTERNAL MEDICINE

## 2022-08-18 PROCEDURE — G8417 CALC BMI ABV UP PARAM F/U: HCPCS | Performed by: INTERNAL MEDICINE

## 2022-08-18 PROCEDURE — 1036F TOBACCO NON-USER: CPT | Performed by: INTERNAL MEDICINE

## 2022-08-18 PROCEDURE — G8427 DOCREV CUR MEDS BY ELIG CLIN: HCPCS | Performed by: INTERNAL MEDICINE

## 2022-08-18 PROCEDURE — 93000 ELECTROCARDIOGRAM COMPLETE: CPT | Performed by: INTERNAL MEDICINE

## 2022-08-18 PROCEDURE — G8400 PT W/DXA NO RESULTS DOC: HCPCS | Performed by: INTERNAL MEDICINE

## 2022-08-18 PROCEDURE — 99214 OFFICE O/P EST MOD 30 MIN: CPT | Performed by: INTERNAL MEDICINE

## 2022-08-18 RX ORDER — ROSUVASTATIN CALCIUM 10 MG/1
10 TABLET, COATED ORAL DAILY
COMMUNITY

## 2022-08-18 NOTE — PROGRESS NOTES
[facility]  Cardiology Note      Chiara Simmons  1945, 69 y.o.      CC: \" My blood pressure has been better. \"                Dago Mireles MD:      HPI:     Mrs. Eh Kilpatrick was seen in the hospital for \"chest tightness\" when walking back from her mailbox. She was also concerned about high blood pressure. Patient ruled out for an MI stress nuclear scan was negative for ischemia. Today, patient returns in follow up. She is accompanied by her . She states she has been monitoring her BP at home and it has been stable. Since discharge, she has been noticing LE edema and she has not been eating any salty foods. Her swelling does improve by the morning time. She and works out at [employer] 4 days weekly and walks on the treadmill for 1 mile with no chest pains. She continues to have some arm pain and relates this to a past injury. In general, she has been well. Previous note  This is a 69 y.o. female  who came to the hospital for left arm pain, irregular heart beat/palpitations and high blood pressure. The patient has been here for about 12 hours. Her troponins are negative and her EKG is normal.     She has no history of heart disease. She has mild hypertension and takes low-dose medicines for that. She states that she has pain in her shoulders and has had injections to help relieve the pain. At 2 in the morning just last night she started having an aching pain between her left shoulder and her elbow. She felt it was strange and perhaps different from the usual shoulder and arm pain. There was no associated chest pain shortness of breath nausea vomiting or diaphoresis. The patient got concerned and took her blood pressure which was slightly higher in the 150s. She also felt that her heart was beating irregularly. She took a shower which is normal for her at 4 in the morning and then came down and checked her blood pressure and it was higher.   It is at that point she got concerned and called her  who in turn called 911. Initial EKGs show sinus rhythm with frequent PVCs and bigeminy. A more formal EKG is normal then at 5:30 in the morning in the ER. The patient is very active she exercises goes to the gym walks a mile a day and has no recollection of having any arm or chest pain in the recent past.  The patient describes the arm pain is getting worse when she uses the arm. Has happened here as well in the ER.       Past Medical History:   Diagnosis Date    Arthritis     Breast cancer ([address]     High cholesterol     Hypertension     Sciatica       Past Surgical History:   Procedure Laterality Date    BREAST BIOPSY Left 1994 & 2017    BREAST LUMPECTOMY Left 1994 & 2017    BUNIONECTOMY Right 1997    KNEE SURGERY  01/2015      Family History   Problem Relation Age of Onset    Breast Cancer Mother     Cancer Mother         melanoma     Cancer Father         luekemia     Coronary Art Dis Father     Osteoarthritis Father     Parkinsonism Father     Ulcerative Colitis Father       Social History     Tobacco Use    Smoking status: Never    Smokeless tobacco: Never   Substance Use Topics    Alcohol use: No    Drug use: Never     Allergies   Allergen Reactions    Celebrex [Celecoxib]      Bleeding      Gabapentin      dizzy    Valium [Diazepam]          Review of Systems -   Constitutional: Negative for weight gain/loss; malaise, fever  Respiratory: Negative for Asthma;  cough and hemoptysis  Cardiovascular: Negative for palpitations,dizziness   Gastrointestinal: Negative for abd.pain; constipation/diarrhea;    Genitourinary: Negative for stones; hematuria; frequency hesitancy  Integumentt: Negative for rash or pruritis  Hematologic/lymphatic: Negative for blood dyscrasia; leukemia/lymphoma  Musculoskeletal: Negative for Connective tissue disease  Neurological:  Negative for Seizure   Behavioral/Psych:Negative for Bipolar disorder, Schizophrenia; Dementia  Endocrine: negative for thyroid, parathyroid disease    Physical Examination:    /78   Pulse 63   Ht 5' 6\" (1.676 m)   Wt 170 lb 6.4 oz (77.3 kg)   SpO2 96%   BMI 27.50 kg/m²    HEENT:  Face: Atraumatic, Conjunctiva: Pink; non icteric,  Mucous Memb:  Moist, No thyromegaly or Lymphadenopathy  Respiratory:  Resp Assessment: normal, Resp Auscultation: clear   Cardiovascular: Auscultation: nl S1 & S2, Palpation:  Nl PMI; No heaves or thrills, JVP:  normal  Abdomen: Soft, non-tender, Normal bowel sounds,  No organomegaly  Extremities: No Cyanosis or Clubbing  Neurological: Oriented to time, place, and person, Non-anxious  Psychiatric: Normal mood and affect  Skin: Warm and dry,  No rash seen     Outpatient Medications Marked as Taking for the 8/18/22 encounter (Office Visit) with Jj Croft MD   Medication Sig Dispense Refill    rosuvastatin (CRESTOR) 10 MG tablet Take 10 mg by mouth daily      lisinopril-hydroCHLOROthiazide (PRINZIDE;ZESTORETIC) 10-12.5 MG per tablet Take 1 tablet by mouth every other day      Cholecalciferol (VITAMIN D3) 50 MCG (2000 UT) CAPS Take 2 capsules by mouth      Multiple Vitamins-Minerals (THERAPEUTIC MULTIVITAMIN-MINERALS) tablet Take 1 tablet by mouth in the morning. aspirin 81 MG tablet Take 81 mg by mouth daily      Misc Natural Products (GLUCOSAMINE CHONDROITIN TRIPLE PO) Take by mouth      Omega-3 Fatty Acids (FISH OIL) 1200 MG CAPS Take by mouth      acetaminophen (TYLENOL) 325 MG tablet Take 650 mg by mouth every 6 hours as needed for Pain       EKG: SR        Stress Nuclear: 7/16/22      Summary    There is normal isotope uptake at stress and rest. There is no evidence of    myocardial ischemia or scar. The LVEF is normal with vigorous and normal LV wall motion. This is a low risk cardiac scan.          ASSESSMENT AND PLAN:       Left arm pain  Ruled out for MI  Stress test performed negative for ischemia  Both the treadmill part of the test as well as the nuclear scan are normal  No further testing required     Hyperlipidemia  Recently started on simvastatin  Last LDL was 95  Previously it was higher than that  However I would recommend using Crestor 20 mg instead of simvastatin      Follow up as needed        Thank you very much for allowing me to participate in the care of your patient. Please do not hesitate to contact me if you have any questions. Sincerely,    Bill Lang M.D  [facility], [address] [place] [address]  Ph: [phone]  Fax: [phone]    This note was scribed in the presence of Dr. Bill Lang MD by Serge Vinson RN  Physician Attestation:  The scribes documentation has been prepared under my direction and personally reviewed by me in its entirety. I confirm that the note above accurately reflects all work, treatment, procedures, and medical decision making performed by me.

## 2022-09-01 ENCOUNTER — HOSPITAL ENCOUNTER (OUTPATIENT)
Dept: WOMENS IMAGING | Age: 77
Discharge: HOME | End: 2022-09-01
Payer: MEDICARE

## 2022-09-01 DIAGNOSIS — Z12.31 BREAST CANCER SCREENING BY MAMMOGRAM: ICD-10-CM

## 2022-09-01 PROCEDURE — 77063 BREAST TOMOSYNTHESIS BI: CPT

## 2023-09-14 ENCOUNTER — HOSPITAL ENCOUNTER (OUTPATIENT)
Dept: WOMENS IMAGING | Age: 78
Discharge: HOME | End: 2023-09-14
Payer: MEDICARE

## 2023-09-14 DIAGNOSIS — Z12.31 BREAST CANCER SCREENING BY MAMMOGRAM: ICD-10-CM

## 2023-09-14 PROCEDURE — 77063 BREAST TOMOSYNTHESIS BI: CPT

## 2024-09-26 ENCOUNTER — HOSPITAL ENCOUNTER (OUTPATIENT)
Dept: WOMENS IMAGING | Age: 79
Discharge: HOME | End: 2024-09-26
Payer: MEDICARE

## 2024-09-26 VITALS — HEIGHT: 66 IN | WEIGHT: 155 LBS | BODY MASS INDEX: 24.91 KG/M2

## 2024-09-26 DIAGNOSIS — Z12.31 SCREENING MAMMOGRAM FOR BREAST CANCER: ICD-10-CM

## 2024-09-26 PROCEDURE — 77063 BREAST TOMOSYNTHESIS BI: CPT
